# Patient Record
Sex: MALE | Race: BLACK OR AFRICAN AMERICAN | NOT HISPANIC OR LATINO | Employment: OTHER | URBAN - METROPOLITAN AREA
[De-identification: names, ages, dates, MRNs, and addresses within clinical notes are randomized per-mention and may not be internally consistent; named-entity substitution may affect disease eponyms.]

---

## 2018-12-16 ENCOUNTER — LAB REQUISITION (OUTPATIENT)
Dept: LAB | Facility: HOSPITAL | Age: 68
End: 2018-12-16

## 2018-12-16 DIAGNOSIS — Z00.00 ROUTINE GENERAL MEDICAL EXAMINATION AT A HEALTH CARE FACILITY: ICD-10-CM

## 2018-12-16 LAB
ANION GAP SERPL CALCULATED.3IONS-SCNC: 11.2 MMOL/L
ANISOCYTOSIS BLD QL: ABNORMAL
BACTERIA UR QL AUTO: ABNORMAL /HPF
BILIRUB UR QL STRIP: NEGATIVE
BUN BLD-MCNC: 17 MG/DL (ref 8–23)
BUN/CREAT SERPL: 11.2 (ref 7–25)
CALCIUM SPEC-SCNC: 8.8 MG/DL (ref 8.6–10.5)
CHLORIDE SERPL-SCNC: 99 MMOL/L (ref 98–107)
CLARITY UR: ABNORMAL
CO2 SERPL-SCNC: 29.8 MMOL/L (ref 22–29)
COLOR UR: YELLOW
CREAT BLD-MCNC: 1.52 MG/DL (ref 0.76–1.27)
DEPRECATED RDW RBC AUTO: 53.9 FL (ref 37–54)
ERYTHROCYTE [DISTWIDTH] IN BLOOD BY AUTOMATED COUNT: 15.7 % (ref 11.5–14.5)
GFR SERPL CREATININE-BSD FRML MDRD: 46 ML/MIN/1.73
GFR SERPL CREATININE-BSD FRML MDRD: 56 ML/MIN/1.73
GLUCOSE BLD-MCNC: 185 MG/DL (ref 65–99)
GLUCOSE UR STRIP-MCNC: NEGATIVE MG/DL
HCT VFR BLD AUTO: 31.6 % (ref 40.4–52.2)
HGB BLD-MCNC: 9.9 G/DL (ref 13.7–17.6)
HGB UR QL STRIP.AUTO: ABNORMAL
HYALINE CASTS UR QL AUTO: ABNORMAL /LPF
KETONES UR QL STRIP: NEGATIVE
LEUKOCYTE ESTERASE UR QL STRIP.AUTO: ABNORMAL
LYMPHOCYTES # BLD MANUAL: 6.81 10*3/MM3 (ref 0.9–4.8)
LYMPHOCYTES NFR BLD MANUAL: 57 % (ref 19.6–45.3)
LYMPHOCYTES NFR BLD MANUAL: 7 % (ref 5–12)
MCH RBC QN AUTO: 29.7 PG (ref 27–32.7)
MCHC RBC AUTO-ENTMCNC: 31.3 G/DL (ref 32.6–36.4)
MCV RBC AUTO: 94.9 FL (ref 79.8–96.2)
MONOCYTES # BLD AUTO: 0.84 10*3/MM3 (ref 0.2–1.2)
NEUTROPHILS # BLD AUTO: 4.06 10*3/MM3 (ref 1.9–8.1)
NEUTROPHILS NFR BLD MANUAL: 34 % (ref 42.7–76)
NITRITE UR QL STRIP: POSITIVE
NRBC SPEC MANUAL: 1 /100 WBC (ref 0–0)
PH UR STRIP.AUTO: 7.5 [PH] (ref 5–8)
PLAT MORPH BLD: NORMAL
PLATELET # BLD AUTO: 142 10*3/MM3 (ref 140–500)
PMV BLD AUTO: 10.4 FL (ref 6–12)
POTASSIUM BLD-SCNC: 3.8 MMOL/L (ref 3.5–5.2)
PROT UR QL STRIP: ABNORMAL
RBC # BLD AUTO: 3.33 10*6/MM3 (ref 4.6–6)
RBC # UR: ABNORMAL /HPF
REF LAB TEST METHOD: ABNORMAL
SCAN SLIDE: NORMAL
SODIUM BLD-SCNC: 140 MMOL/L (ref 136–145)
SP GR UR STRIP: 1.02 (ref 1–1.03)
SQUAMOUS #/AREA URNS HPF: ABNORMAL /HPF
UROBILINOGEN UR QL STRIP: ABNORMAL
VARIANT LYMPHS NFR BLD MANUAL: 2 % (ref 0–5)
WBC MORPH BLD: NORMAL
WBC NRBC COR # BLD: 11.95 10*3/MM3 (ref 4.5–10.7)
WBC UR QL AUTO: ABNORMAL /HPF

## 2018-12-16 PROCEDURE — 81001 URINALYSIS AUTO W/SCOPE: CPT

## 2018-12-16 PROCEDURE — 85007 BL SMEAR W/DIFF WBC COUNT: CPT

## 2018-12-16 PROCEDURE — 85025 COMPLETE CBC W/AUTO DIFF WBC: CPT

## 2018-12-16 PROCEDURE — 80048 BASIC METABOLIC PNL TOTAL CA: CPT

## 2019-12-09 ENCOUNTER — LAB REQUISITION (OUTPATIENT)
Dept: LAB | Facility: HOSPITAL | Age: 69
End: 2019-12-09

## 2019-12-09 DIAGNOSIS — Z00.00 ROUTINE GENERAL MEDICAL EXAMINATION AT A HEALTH CARE FACILITY: ICD-10-CM

## 2019-12-09 LAB — NT-PROBNP SERPL-MCNC: 67.8 PG/ML (ref 5–900)

## 2019-12-09 PROCEDURE — 83880 ASSAY OF NATRIURETIC PEPTIDE: CPT

## 2020-01-12 ENCOUNTER — APPOINTMENT (OUTPATIENT)
Dept: CT IMAGING | Facility: HOSPITAL | Age: 70
End: 2020-01-12

## 2020-01-12 ENCOUNTER — HOSPITAL ENCOUNTER (INPATIENT)
Facility: HOSPITAL | Age: 70
LOS: 7 days | Discharge: NURSING, REHAB OR LONG-TERM CARE | End: 2020-01-19
Attending: EMERGENCY MEDICINE | Admitting: INTERNAL MEDICINE

## 2020-01-12 ENCOUNTER — APPOINTMENT (OUTPATIENT)
Dept: GENERAL RADIOLOGY | Facility: HOSPITAL | Age: 70
End: 2020-01-12

## 2020-01-12 DIAGNOSIS — D63.8 ANEMIA, CHRONIC DISEASE: ICD-10-CM

## 2020-01-12 DIAGNOSIS — J96.01 SEPSIS WITH ACUTE HYPOXIC RESPIRATORY FAILURE, DUE TO UNSPECIFIED ORGANISM, UNSPECIFIED WHETHER SEPTIC SHOCK PRESENT (HCC): Primary | ICD-10-CM

## 2020-01-12 DIAGNOSIS — J44.9 CHRONIC OBSTRUCTIVE PULMONARY DISEASE, UNSPECIFIED COPD TYPE (HCC): ICD-10-CM

## 2020-01-12 DIAGNOSIS — N39.0 ACUTE UTI: ICD-10-CM

## 2020-01-12 DIAGNOSIS — N17.9 ACUTE RENAL FAILURE, UNSPECIFIED ACUTE RENAL FAILURE TYPE (HCC): ICD-10-CM

## 2020-01-12 DIAGNOSIS — A41.9 SEPSIS WITH ACUTE HYPOXIC RESPIRATORY FAILURE, DUE TO UNSPECIFIED ORGANISM, UNSPECIFIED WHETHER SEPTIC SHOCK PRESENT (HCC): Primary | ICD-10-CM

## 2020-01-12 DIAGNOSIS — D69.6 THROMBOCYTOPENIA (HCC): ICD-10-CM

## 2020-01-12 DIAGNOSIS — J96.21 ACUTE AND CHRONIC RESPIRATORY FAILURE WITH HYPOXIA (HCC): ICD-10-CM

## 2020-01-12 DIAGNOSIS — N20.0 NEPHROLITHIASIS: ICD-10-CM

## 2020-01-12 DIAGNOSIS — R65.20 SEPSIS WITH ACUTE HYPOXIC RESPIRATORY FAILURE, DUE TO UNSPECIFIED ORGANISM, UNSPECIFIED WHETHER SEPTIC SHOCK PRESENT (HCC): Primary | ICD-10-CM

## 2020-01-12 LAB
ALBUMIN SERPL-MCNC: 3.6 G/DL (ref 3.5–5.2)
ALBUMIN/GLOB SERPL: 0.7 G/DL
ALP SERPL-CCNC: 78 U/L (ref 39–117)
ALT SERPL W P-5'-P-CCNC: 30 U/L (ref 1–41)
ANION GAP SERPL CALCULATED.3IONS-SCNC: 16.5 MMOL/L (ref 5–15)
ARTERIAL PATENCY WRIST A: POSITIVE
AST SERPL-CCNC: 44 U/L (ref 1–40)
ATMOSPHERIC PRESS: 755.1 MMHG
B PARAPERT DNA SPEC QL NAA+PROBE: NOT DETECTED
B PERT DNA SPEC QL NAA+PROBE: NOT DETECTED
BACTERIA UR QL AUTO: ABNORMAL /HPF
BASE EXCESS BLDA CALC-SCNC: -0.3 MMOL/L (ref 0–2)
BDY SITE: ABNORMAL
BILIRUB SERPL-MCNC: 0.5 MG/DL (ref 0.2–1.2)
BILIRUB UR QL STRIP: NEGATIVE
BUN BLD-MCNC: 38 MG/DL (ref 8–23)
BUN/CREAT SERPL: 12.8 (ref 7–25)
C PNEUM DNA NPH QL NAA+NON-PROBE: NOT DETECTED
CALCIUM SPEC-SCNC: 9.5 MG/DL (ref 8.6–10.5)
CHLORIDE SERPL-SCNC: 99 MMOL/L (ref 98–107)
CLARITY UR: ABNORMAL
CO2 SERPL-SCNC: 24.5 MMOL/L (ref 22–29)
COLOR UR: ABNORMAL
CREAT BLD-MCNC: 2.98 MG/DL (ref 0.76–1.27)
D-LACTATE SERPL-SCNC: 1.2 MMOL/L (ref 0.5–2)
DEPRECATED RDW RBC AUTO: 44.8 FL (ref 37–54)
ERYTHROCYTE [DISTWIDTH] IN BLOOD BY AUTOMATED COUNT: 13.1 % (ref 12.3–15.4)
FLUAV H1 2009 PAND RNA NPH QL NAA+PROBE: NOT DETECTED
FLUAV H1 HA GENE NPH QL NAA+PROBE: NOT DETECTED
FLUAV H3 RNA NPH QL NAA+PROBE: NOT DETECTED
FLUAV SUBTYP SPEC NAA+PROBE: NOT DETECTED
FLUBV RNA ISLT QL NAA+PROBE: NOT DETECTED
GAS FLOW AIRWAY: 7 LPM
GFR SERPL CREATININE-BSD FRML MDRD: 21 ML/MIN/1.73
GFR SERPL CREATININE-BSD FRML MDRD: 25 ML/MIN/1.73
GLOBULIN UR ELPH-MCNC: 5 GM/DL
GLUCOSE BLD-MCNC: 128 MG/DL (ref 65–99)
GLUCOSE BLDC GLUCOMTR-MCNC: 141 MG/DL (ref 70–130)
GLUCOSE UR STRIP-MCNC: NEGATIVE MG/DL
HADV DNA SPEC NAA+PROBE: NOT DETECTED
HCO3 BLDA-SCNC: 25.9 MMOL/L (ref 22–28)
HCOV 229E RNA SPEC QL NAA+PROBE: NOT DETECTED
HCOV HKU1 RNA SPEC QL NAA+PROBE: NOT DETECTED
HCOV NL63 RNA SPEC QL NAA+PROBE: NOT DETECTED
HCOV OC43 RNA SPEC QL NAA+PROBE: NOT DETECTED
HCT VFR BLD AUTO: 30.7 % (ref 37.5–51)
HGB BLD-MCNC: 10.1 G/DL (ref 13–17.7)
HGB UR QL STRIP.AUTO: ABNORMAL
HMPV RNA NPH QL NAA+NON-PROBE: NOT DETECTED
HOLD SPECIMEN: NORMAL
HOLD SPECIMEN: NORMAL
HPIV1 RNA SPEC QL NAA+PROBE: NOT DETECTED
HPIV2 RNA SPEC QL NAA+PROBE: NOT DETECTED
HPIV3 RNA NPH QL NAA+PROBE: NOT DETECTED
HPIV4 P GENE NPH QL NAA+PROBE: NOT DETECTED
HYALINE CASTS UR QL AUTO: ABNORMAL /LPF
KETONES UR QL STRIP: ABNORMAL
LEUKOCYTE ESTERASE UR QL STRIP.AUTO: ABNORMAL
LYMPHOCYTES # BLD MANUAL: 4.18 10*3/MM3 (ref 0.7–3.1)
LYMPHOCYTES NFR BLD MANUAL: 39.6 % (ref 19.6–45.3)
LYMPHOCYTES NFR BLD MANUAL: 7.9 % (ref 5–12)
M PNEUMO IGG SER IA-ACNC: NOT DETECTED
MAGNESIUM SERPL-MCNC: 2.4 MG/DL (ref 1.6–2.4)
MCH RBC QN AUTO: 30.4 PG (ref 26.6–33)
MCHC RBC AUTO-ENTMCNC: 32.9 G/DL (ref 31.5–35.7)
MCV RBC AUTO: 92.5 FL (ref 79–97)
MODALITY: ABNORMAL
MONOCYTES # BLD AUTO: 0.83 10*3/MM3 (ref 0.1–0.9)
NEUTROPHILS # BLD AUTO: 5.54 10*3/MM3 (ref 1.7–7)
NEUTROPHILS NFR BLD MANUAL: 52.5 % (ref 42.7–76)
NITRITE UR QL STRIP: POSITIVE
NT-PROBNP SERPL-MCNC: 2104 PG/ML (ref 5–900)
PCO2 BLDA: 47.8 MM HG (ref 35–45)
PH BLDA: 7.34 PH UNITS (ref 7.35–7.45)
PH UR STRIP.AUTO: 7.5 [PH] (ref 5–8)
PLAT MORPH BLD: NORMAL
PLATELET # BLD AUTO: 113 10*3/MM3 (ref 140–450)
PMV BLD AUTO: 10 FL (ref 6–12)
PO2 BLDA: 75.8 MM HG (ref 80–100)
POLYCHROMASIA BLD QL SMEAR: ABNORMAL
POTASSIUM BLD-SCNC: 3.9 MMOL/L (ref 3.5–5.2)
PROT SERPL-MCNC: 8.6 G/DL (ref 6–8.5)
PROT UR QL STRIP: ABNORMAL
RBC # BLD AUTO: 3.32 10*6/MM3 (ref 4.14–5.8)
RBC # UR: ABNORMAL /HPF
REF LAB TEST METHOD: ABNORMAL
RHINOVIRUS RNA SPEC NAA+PROBE: NOT DETECTED
RSV RNA NPH QL NAA+NON-PROBE: NOT DETECTED
SAO2 % BLDCOA: 94 % (ref 92–99)
SODIUM BLD-SCNC: 140 MMOL/L (ref 136–145)
SP GR UR STRIP: 1.01 (ref 1–1.03)
SQUAMOUS #/AREA URNS HPF: ABNORMAL /HPF
TOTAL RATE: 28 BREATHS/MINUTE
TROPONIN T SERPL-MCNC: 0.03 NG/ML (ref 0–0.03)
TSH SERPL DL<=0.05 MIU/L-ACNC: 0.41 UIU/ML (ref 0.27–4.2)
UROBILINOGEN UR QL STRIP: ABNORMAL
WBC MORPH BLD: NORMAL
WBC NRBC COR # BLD: 10.55 10*3/MM3 (ref 3.4–10.8)
WBC UR QL AUTO: ABNORMAL /HPF
WHOLE BLOOD HOLD SPECIMEN: NORMAL
WHOLE BLOOD HOLD SPECIMEN: NORMAL

## 2020-01-12 PROCEDURE — 85007 BL SMEAR W/DIFF WBC COUNT: CPT | Performed by: EMERGENCY MEDICINE

## 2020-01-12 PROCEDURE — 25010000002 VANCOMYCIN 10 G RECONSTITUTED SOLUTION: Performed by: EMERGENCY MEDICINE

## 2020-01-12 PROCEDURE — 85025 COMPLETE CBC W/AUTO DIFF WBC: CPT | Performed by: EMERGENCY MEDICINE

## 2020-01-12 PROCEDURE — 71250 CT THORAX DX C-: CPT

## 2020-01-12 PROCEDURE — 0100U HC BIOFIRE FILMARRAY RESP PANEL 2: CPT | Performed by: EMERGENCY MEDICINE

## 2020-01-12 PROCEDURE — 87040 BLOOD CULTURE FOR BACTERIA: CPT | Performed by: EMERGENCY MEDICINE

## 2020-01-12 PROCEDURE — 82962 GLUCOSE BLOOD TEST: CPT

## 2020-01-12 PROCEDURE — 71045 X-RAY EXAM CHEST 1 VIEW: CPT

## 2020-01-12 PROCEDURE — 84484 ASSAY OF TROPONIN QUANT: CPT | Performed by: EMERGENCY MEDICINE

## 2020-01-12 PROCEDURE — 87186 SC STD MICRODIL/AGAR DIL: CPT | Performed by: EMERGENCY MEDICINE

## 2020-01-12 PROCEDURE — 36415 COLL VENOUS BLD VENIPUNCTURE: CPT

## 2020-01-12 PROCEDURE — 81001 URINALYSIS AUTO W/SCOPE: CPT | Performed by: EMERGENCY MEDICINE

## 2020-01-12 PROCEDURE — 80053 COMPREHEN METABOLIC PANEL: CPT | Performed by: EMERGENCY MEDICINE

## 2020-01-12 PROCEDURE — 83735 ASSAY OF MAGNESIUM: CPT | Performed by: EMERGENCY MEDICINE

## 2020-01-12 PROCEDURE — 94640 AIRWAY INHALATION TREATMENT: CPT

## 2020-01-12 PROCEDURE — 87181 SC STD AGAR DILUTION PER AGT: CPT | Performed by: EMERGENCY MEDICINE

## 2020-01-12 PROCEDURE — 82803 BLOOD GASES ANY COMBINATION: CPT

## 2020-01-12 PROCEDURE — 84443 ASSAY THYROID STIM HORMONE: CPT | Performed by: EMERGENCY MEDICINE

## 2020-01-12 PROCEDURE — 74176 CT ABD & PELVIS W/O CONTRAST: CPT

## 2020-01-12 PROCEDURE — P9612 CATHETERIZE FOR URINE SPEC: HCPCS

## 2020-01-12 PROCEDURE — 93005 ELECTROCARDIOGRAM TRACING: CPT | Performed by: EMERGENCY MEDICINE

## 2020-01-12 PROCEDURE — 87077 CULTURE AEROBIC IDENTIFY: CPT | Performed by: EMERGENCY MEDICINE

## 2020-01-12 PROCEDURE — 87086 URINE CULTURE/COLONY COUNT: CPT | Performed by: EMERGENCY MEDICINE

## 2020-01-12 PROCEDURE — 83605 ASSAY OF LACTIC ACID: CPT | Performed by: EMERGENCY MEDICINE

## 2020-01-12 PROCEDURE — 93010 ELECTROCARDIOGRAM REPORT: CPT | Performed by: INTERNAL MEDICINE

## 2020-01-12 PROCEDURE — 25010000002 PIPERACILLIN SOD-TAZOBACTAM PER 1 G: Performed by: EMERGENCY MEDICINE

## 2020-01-12 PROCEDURE — 83880 ASSAY OF NATRIURETIC PEPTIDE: CPT | Performed by: EMERGENCY MEDICINE

## 2020-01-12 PROCEDURE — 36600 WITHDRAWAL OF ARTERIAL BLOOD: CPT

## 2020-01-12 PROCEDURE — 99285 EMERGENCY DEPT VISIT HI MDM: CPT

## 2020-01-12 RX ORDER — POLYETHYLENE GLYCOL 3350 17 G/17G
17 POWDER, FOR SOLUTION ORAL DAILY
COMMUNITY
Start: 2017-11-06 | End: 2020-01-19 | Stop reason: HOSPADM

## 2020-01-12 RX ORDER — TRAZODONE HYDROCHLORIDE 150 MG/1
100 TABLET ORAL DAILY
COMMUNITY
Start: 2016-10-03

## 2020-01-12 RX ORDER — SODIUM CHLORIDE 9 MG/ML
125 INJECTION, SOLUTION INTRAVENOUS CONTINUOUS
Status: DISCONTINUED | OUTPATIENT
Start: 2020-01-12 | End: 2020-01-13

## 2020-01-12 RX ORDER — SODIUM CHLORIDE 0.9 % (FLUSH) 0.9 %
10 SYRINGE (ML) INJECTION AS NEEDED
Status: DISCONTINUED | OUTPATIENT
Start: 2020-01-12 | End: 2020-01-19 | Stop reason: HOSPADM

## 2020-01-12 RX ORDER — ONDANSETRON 4 MG/1
4 TABLET, FILM COATED ORAL EVERY 8 HOURS PRN
COMMUNITY
End: 2020-01-19 | Stop reason: HOSPADM

## 2020-01-12 RX ORDER — IPRATROPIUM BROMIDE AND ALBUTEROL SULFATE 2.5; .5 MG/3ML; MG/3ML
3 SOLUTION RESPIRATORY (INHALATION)
COMMUNITY
Start: 2013-03-18 | End: 2020-01-19 | Stop reason: HOSPADM

## 2020-01-12 RX ORDER — DULOXETIN HYDROCHLORIDE 60 MG/1
60 CAPSULE, DELAYED RELEASE ORAL 2 TIMES DAILY
COMMUNITY

## 2020-01-12 RX ORDER — GABAPENTIN 600 MG/1
600 TABLET ORAL 2 TIMES DAILY
COMMUNITY
Start: 2019-06-25

## 2020-01-12 RX ORDER — BISACODYL 10 MG
10 SUPPOSITORY, RECTAL RECTAL
COMMUNITY
End: 2020-01-19 | Stop reason: HOSPADM

## 2020-01-12 RX ORDER — ECHINACEA PURPUREA EXTRACT 125 MG
TABLET ORAL
COMMUNITY
End: 2020-01-19 | Stop reason: HOSPADM

## 2020-01-12 RX ORDER — LATANOPROST 50 UG/ML
SOLUTION/ DROPS OPHTHALMIC
COMMUNITY
Start: 2016-09-18

## 2020-01-12 RX ORDER — SENNOSIDES 8.6 MG
1 TABLET ORAL NIGHTLY
COMMUNITY
End: 2020-01-19 | Stop reason: HOSPADM

## 2020-01-12 RX ORDER — TAMSULOSIN HYDROCHLORIDE 0.4 MG/1
0.4 CAPSULE ORAL DAILY
COMMUNITY
Start: 2016-10-13

## 2020-01-12 RX ORDER — CHOLECALCIFEROL (VITAMIN D3) 125 MCG
3 CAPSULE ORAL DAILY
COMMUNITY

## 2020-01-12 RX ORDER — ATORVASTATIN CALCIUM 80 MG/1
80 TABLET, FILM COATED ORAL DAILY
COMMUNITY
Start: 2016-09-15

## 2020-01-12 RX ORDER — ALBUTEROL SULFATE 2.5 MG/3ML
2.5 SOLUTION RESPIRATORY (INHALATION) ONCE
Status: COMPLETED | OUTPATIENT
Start: 2020-01-12 | End: 2020-01-12

## 2020-01-12 RX ORDER — FERROUS SULFATE 325(65) MG
325 TABLET ORAL DAILY
COMMUNITY

## 2020-01-12 RX ORDER — ASCORBIC ACID 500 MG
500 TABLET ORAL DAILY
COMMUNITY
End: 2020-01-19 | Stop reason: HOSPADM

## 2020-01-12 RX ORDER — QUETIAPINE 200 MG/1
100 TABLET, FILM COATED, EXTENDED RELEASE ORAL DAILY
COMMUNITY
Start: 2016-10-18 | End: 2020-01-19 | Stop reason: HOSPADM

## 2020-01-12 RX ORDER — HYDROCODONE BITARTRATE AND ACETAMINOPHEN 10; 325 MG/1; MG/1
1 TABLET ORAL EVERY 6 HOURS PRN
COMMUNITY
Start: 2019-06-25

## 2020-01-12 RX ORDER — SIMETHICONE 125 MG
125 TABLET,CHEWABLE ORAL EVERY 6 HOURS PRN
COMMUNITY
End: 2020-01-19 | Stop reason: HOSPADM

## 2020-01-12 RX ORDER — TIZANIDINE 4 MG/1
4 TABLET ORAL 2 TIMES DAILY
COMMUNITY
End: 2020-01-19 | Stop reason: HOSPADM

## 2020-01-12 RX ORDER — BUDESONIDE AND FORMOTEROL FUMARATE DIHYDRATE 160; 4.5 UG/1; UG/1
2 AEROSOL RESPIRATORY (INHALATION) 2 TIMES DAILY
COMMUNITY

## 2020-01-12 RX ORDER — NITROGLYCERIN 0.4 MG/1
0.4 TABLET SUBLINGUAL
COMMUNITY

## 2020-01-12 RX ORDER — LEVETIRACETAM 750 MG/1
750 TABLET ORAL 2 TIMES DAILY
COMMUNITY
Start: 2017-11-05

## 2020-01-12 RX ADMIN — ALBUTEROL SULFATE 2.5 MG: 2.5 SOLUTION RESPIRATORY (INHALATION) at 22:12

## 2020-01-12 RX ADMIN — TAZOBACTAM SODIUM AND PIPERACILLIN SODIUM 3.38 G: 375; 3 INJECTION, SOLUTION INTRAVENOUS at 20:27

## 2020-01-12 RX ADMIN — SODIUM CHLORIDE 1750 MG: 9 INJECTION, SOLUTION INTRAVENOUS at 21:22

## 2020-01-12 RX ADMIN — SODIUM CHLORIDE 1000 ML: 9 INJECTION, SOLUTION INTRAVENOUS at 21:47

## 2020-01-13 LAB
CREAT BLD-MCNC: 2.42 MG/DL (ref 0.76–1.27)
D-LACTATE SERPL-SCNC: 1.2 MMOL/L (ref 0.5–2)
GFR SERPL CREATININE-BSD FRML MDRD: 32 ML/MIN/1.73
GLUCOSE BLDC GLUCOMTR-MCNC: 119 MG/DL (ref 70–130)
GLUCOSE BLDC GLUCOMTR-MCNC: 137 MG/DL (ref 70–130)
GLUCOSE BLDC GLUCOMTR-MCNC: 148 MG/DL (ref 70–130)
GLUCOSE BLDC GLUCOMTR-MCNC: 181 MG/DL (ref 70–130)
MRSA DNA SPEC QL NAA+PROBE: NORMAL
VANCOMYCIN SERPL-MCNC: 21 MCG/ML (ref 5–40)

## 2020-01-13 PROCEDURE — 25010000002 LEVETRIRACETAM PER 10 MG: Performed by: INTERNAL MEDICINE

## 2020-01-13 PROCEDURE — 25010000002 ENOXAPARIN PER 10 MG: Performed by: INTERNAL MEDICINE

## 2020-01-13 PROCEDURE — 94799 UNLISTED PULMONARY SVC/PX: CPT

## 2020-01-13 PROCEDURE — 94640 AIRWAY INHALATION TREATMENT: CPT

## 2020-01-13 PROCEDURE — 80202 ASSAY OF VANCOMYCIN: CPT | Performed by: INTERNAL MEDICINE

## 2020-01-13 PROCEDURE — 83605 ASSAY OF LACTIC ACID: CPT | Performed by: INTERNAL MEDICINE

## 2020-01-13 PROCEDURE — 82962 GLUCOSE BLOOD TEST: CPT

## 2020-01-13 PROCEDURE — 25010000002 PIPERACILLIN SOD-TAZOBACTAM PER 1 G: Performed by: INTERNAL MEDICINE

## 2020-01-13 PROCEDURE — 25010000002 VANCOMYCIN 10 G RECONSTITUTED SOLUTION: Performed by: INTERNAL MEDICINE

## 2020-01-13 PROCEDURE — 87641 MR-STAPH DNA AMP PROBE: CPT | Performed by: INTERNAL MEDICINE

## 2020-01-13 PROCEDURE — 82565 ASSAY OF CREATININE: CPT | Performed by: INTERNAL MEDICINE

## 2020-01-13 RX ORDER — SODIUM CHLORIDE 0.9 % (FLUSH) 0.9 %
10 SYRINGE (ML) INJECTION EVERY 12 HOURS SCHEDULED
Status: DISCONTINUED | OUTPATIENT
Start: 2020-01-13 | End: 2020-01-13

## 2020-01-13 RX ORDER — SODIUM CHLORIDE 0.9 % (FLUSH) 0.9 %
10 SYRINGE (ML) INJECTION AS NEEDED
Status: DISCONTINUED | OUTPATIENT
Start: 2020-01-13 | End: 2020-01-19 | Stop reason: HOSPADM

## 2020-01-13 RX ORDER — BUDESONIDE 0.5 MG/2ML
0.5 INHALANT ORAL
Status: DISCONTINUED | OUTPATIENT
Start: 2020-01-13 | End: 2020-01-19 | Stop reason: HOSPADM

## 2020-01-13 RX ORDER — DEXTROSE MONOHYDRATE 25 G/50ML
25 INJECTION, SOLUTION INTRAVENOUS
Status: DISCONTINUED | OUTPATIENT
Start: 2020-01-13 | End: 2020-01-19 | Stop reason: HOSPADM

## 2020-01-13 RX ORDER — SODIUM CHLORIDE, SODIUM LACTATE, POTASSIUM CHLORIDE, CALCIUM CHLORIDE 600; 310; 30; 20 MG/100ML; MG/100ML; MG/100ML; MG/100ML
100 INJECTION, SOLUTION INTRAVENOUS CONTINUOUS
Status: DISCONTINUED | OUTPATIENT
Start: 2020-01-13 | End: 2020-01-15

## 2020-01-13 RX ORDER — LATANOPROST 50 UG/ML
1 SOLUTION/ DROPS OPHTHALMIC DAILY
Status: DISCONTINUED | OUTPATIENT
Start: 2020-01-13 | End: 2020-01-19 | Stop reason: HOSPADM

## 2020-01-13 RX ORDER — NICOTINE POLACRILEX 4 MG
15 LOZENGE BUCCAL
Status: DISCONTINUED | OUTPATIENT
Start: 2020-01-13 | End: 2020-01-19 | Stop reason: HOSPADM

## 2020-01-13 RX ORDER — ARFORMOTEROL TARTRATE 15 UG/2ML
15 SOLUTION RESPIRATORY (INHALATION)
Status: DISCONTINUED | OUTPATIENT
Start: 2020-01-13 | End: 2020-01-18

## 2020-01-13 RX ORDER — ONDANSETRON 2 MG/ML
4 INJECTION INTRAMUSCULAR; INTRAVENOUS EVERY 6 HOURS PRN
Status: DISCONTINUED | OUTPATIENT
Start: 2020-01-13 | End: 2020-01-19 | Stop reason: HOSPADM

## 2020-01-13 RX ORDER — IPRATROPIUM BROMIDE AND ALBUTEROL SULFATE 2.5; .5 MG/3ML; MG/3ML
3 SOLUTION RESPIRATORY (INHALATION) EVERY 4 HOURS PRN
Status: DISCONTINUED | OUTPATIENT
Start: 2020-01-13 | End: 2020-01-19 | Stop reason: HOSPADM

## 2020-01-13 RX ORDER — ONDANSETRON 4 MG/1
4 TABLET, FILM COATED ORAL EVERY 6 HOURS PRN
Status: DISCONTINUED | OUTPATIENT
Start: 2020-01-13 | End: 2020-01-19 | Stop reason: HOSPADM

## 2020-01-13 RX ORDER — HYDROCODONE BITARTRATE AND ACETAMINOPHEN 10; 325 MG/1; MG/1
1 TABLET ORAL EVERY 6 HOURS PRN
Status: DISCONTINUED | OUTPATIENT
Start: 2020-01-13 | End: 2020-01-19 | Stop reason: HOSPADM

## 2020-01-13 RX ADMIN — LEVETIRACETAM 750 MG: 100 INJECTION, SOLUTION INTRAVENOUS at 03:36

## 2020-01-13 RX ADMIN — ARFORMOTEROL TARTRATE 15 MCG: 15 SOLUTION RESPIRATORY (INHALATION) at 11:39

## 2020-01-13 RX ADMIN — TAZOBACTAM SODIUM AND PIPERACILLIN SODIUM 3.38 G: 375; 3 INJECTION, SOLUTION INTRAVENOUS at 19:29

## 2020-01-13 RX ADMIN — TAZOBACTAM SODIUM AND PIPERACILLIN SODIUM 3.38 G: 375; 3 INJECTION, SOLUTION INTRAVENOUS at 11:32

## 2020-01-13 RX ADMIN — LATANOPROST 1 DROP: 50 SOLUTION OPHTHALMIC at 11:32

## 2020-01-13 RX ADMIN — SODIUM CHLORIDE, POTASSIUM CHLORIDE, SODIUM LACTATE AND CALCIUM CHLORIDE 100 ML/HR: 600; 310; 30; 20 INJECTION, SOLUTION INTRAVENOUS at 11:14

## 2020-01-13 RX ADMIN — HYDROCODONE BITARTRATE AND ACETAMINOPHEN 1 TABLET: 10; 325 TABLET ORAL at 20:46

## 2020-01-13 RX ADMIN — ARFORMOTEROL TARTRATE 15 MCG: 15 SOLUTION RESPIRATORY (INHALATION) at 19:47

## 2020-01-13 RX ADMIN — SODIUM CHLORIDE, POTASSIUM CHLORIDE, SODIUM LACTATE AND CALCIUM CHLORIDE 100 ML/HR: 600; 310; 30; 20 INJECTION, SOLUTION INTRAVENOUS at 20:46

## 2020-01-13 RX ADMIN — HYDROCODONE BITARTRATE AND ACETAMINOPHEN 1 TABLET: 10; 325 TABLET ORAL at 15:10

## 2020-01-13 RX ADMIN — ENOXAPARIN SODIUM 30 MG: 30 INJECTION SUBCUTANEOUS at 08:01

## 2020-01-13 RX ADMIN — TAZOBACTAM SODIUM AND PIPERACILLIN SODIUM 3.38 G: 375; 3 INJECTION, SOLUTION INTRAVENOUS at 04:07

## 2020-01-13 RX ADMIN — SODIUM CHLORIDE, POTASSIUM CHLORIDE, SODIUM LACTATE AND CALCIUM CHLORIDE 100 ML/HR: 600; 310; 30; 20 INJECTION, SOLUTION INTRAVENOUS at 02:36

## 2020-01-13 RX ADMIN — SODIUM CHLORIDE 1250 MG: 9 INJECTION, SOLUTION INTRAVENOUS at 11:32

## 2020-01-13 RX ADMIN — LEVETIRACETAM 750 MG: 100 INJECTION, SOLUTION INTRAVENOUS at 08:01

## 2020-01-13 RX ADMIN — BUDESONIDE 0.5 MG: 0.5 SUSPENSION RESPIRATORY (INHALATION) at 11:39

## 2020-01-13 RX ADMIN — LEVETIRACETAM 750 MG: 100 INJECTION, SOLUTION INTRAVENOUS at 20:46

## 2020-01-13 NOTE — DISCHARGE PLACEMENT REQUEST
"Yevgeniy Vang (69 y.o. Male)     Date of Birth Social Security Number Address Home Phone MRN    [id number]  [id number] [place] [id number] [phone] [id number]    Shinto Marital Status          Unknown Single       Admission Date Admission Type Admitting Provider Attending Provider Department, Room/Bed    1/12/20 Emergency Aman Escobar MD Anaya, Ervin H., MD [facility] CORONARY [facility], [address]/1    Discharge Date Discharge Disposition Discharge Destination                       Attending Provider:  Jatinder Clark MD    Allergies:  No Known Allergies    Isolation:  None   Infection:  None   Code Status:  CPR    Ht:  182.9 cm (72\")   Wt:  79.9 kg (176 lb 2.4 oz)    Admission Cmt:  None   Principal Problem:  None                Active Insurance as of 1/12/2020     Primary Coverage     Payor Plan Insurance Group Employer/Plan Group    MEDICARE MEDICARE B ONLY      Payor Plan Address Payor Plan Phone Number Payor Plan Fax Number Effective Dates    [address] [address] [phone]  4/1/2015 - None Entered    [facility] [id number]       Subscriber Name Subscriber Birth Date Member ID       YEVGENIY VANG 1950 [id number]           Secondary Coverage     Payor Plan Insurance Group Employer/Plan Group    [place]      Payor Plan Address Payor Plan Phone Number Payor Plan Fax Number Effective Dates    [address] [phone]  1/12/2020 - None Entered    [facility] [id number]       Subscriber Name Subscriber Birth Date Member ID       YEVGENIY VANG 1950 [id number]                 Emergency Contacts      (Rel.) Home Phone Work Phone Mobile Phone    SERGIO PIKE (Daughter) [phone] -- --              "

## 2020-01-13 NOTE — PROGRESS NOTES
Discharge Planning Assessment  [facility]     Patient Name: Yevgeniy Vang  MRN: [id number]  Today's Date: 1/13/2020    Admit Date: 1/12/2020    Discharge Needs Assessment     Row Name 01/13/20 1531       Living Environment    Lives With  facility resident    Current Living Arrangements  extended care facility    Primary Care Provided by  other (see comments)    Family Caregiver if Needed  none    Quality of Family Relationships  unable to assess    Able to Return to Prior Arrangements  yes    Living Arrangement Comments  from [place] Pt does not want to return       Resource/Environmental Concerns    Resource/Environmental Concerns  none       Transition Planning    Patient/Family Anticipates Transition to  long term care facility    Patient/Family Anticipated Services at Transition  skilled nursing       Discharge Needs Assessment    Concerns to be Addressed  discharge planning    Anticipated Changes Related to Illness  none    Equipment Needed After Discharge  none        Discharge Plan     Row Name 01/13/20 1532       Plan    Plan  Undetermined    Plan Comments  CCP spoke to patient at bedside to discuss discharge planning.  Pt staess that he is from [facility] but he does not want to return  He states that he is dependent with some ADL's.          Destination      Coordination has not been started for this encounter.      Durable Medical Equipment      Coordination has not been started for this encounter.      Dialysis/Infusion      Coordination has not been started for this encounter.      Home Medical Care      Coordination has not been started for this encounter.      Therapy      Coordination has not been started for this encounter.      Community Resources      Coordination has not been started for this encounter.          Demographic Summary    No documentation.       Functional Status    No documentation.       Psychosocial    No documentation.       Abuse/Neglect    No documentation.        Legal    No documentation.       Substance Abuse    No documentation.       Patient Forms    No documentation.           Sasha Colorado RN

## 2020-01-13 NOTE — PLAN OF CARE
Problem: Patient Care Overview  Goal: Plan of Care Review  Outcome: Ongoing (interventions implemented as appropriate)    Pt admitted to CCU from ER for sepsis and resp failure. Weaned down to 2L [place] which is patient's baseline. Borderline hypotensive overnight with MAP maintaining above 65. Pt more alert this morning. Voicemail left for patient's daughter to inform her of patient's admission.

## 2020-01-13 NOTE — H&P
History and Physical    Patient Name: Yevgeniy Vang  Age/Sex: 69 y.o. male  : 1950  MRN: [id number]    Date of Admission: 2020  Date of Encounter Visit: 20  Encounter Provider: Aman Escobar MD  Place of Service: [facility]   Patient Care Team:  Miracle Julio MD as PCP - General (Internal Medicine)      Subjective:     Chief Complaint: Shortness of breath    History of Present Illness:  Yevgeniy Vang is a 69 y.o. male that was transferred from the nursing home by EMS to the emergency room because of the complaint of acute on chronic shortness of breath, patient was found to be hypoxic, he is usually on 2 L nasal cannula oxygen but was requiring 15 L nonrebreather to maintain saturation in the low 90s.  Patient was unable to provide with any history, but from the EMS record which were later confirmed from the nursing home he did have recent stent and some hematuria however I do not have any medical record as to the type of stent and the indication and those records were requested.  Work-up in the ER was positive for sepsis with shock and mild mixed respiratory/metabolic acidosis with sergio cloudy urine with large leukocyte esterase and positive nitrite, blood pressure was borderline, patient was tachycardic, and the degree of hypoxemia with underlying history of COPD, with the elevated PCO2, and with the renal failure and the shock picture on presentation along with the mild thrombocytopenia and the anemia, CT of the abdomen showed some trace pleural effusion there was a large stone in the left renal pelvis 11 mm and diameter, and cluster of stones in the right renal pelvis largest being 8 mm.  Was numerous small urinary bladder calculi.  Also there was mild hydronephrosis bilaterally as well as perinephric inflammation and edema bilaterally slightly more pronounced on the right side     the decision was made to admit to the intensive care unit for close observation until the  patient is more stabilized.    Records from the nursing home were faxed and reviewed, patient was at the [facility] where he had right extracorporeal shockwave lithotripsy on 8/28/2019, we were able to call and confirm that he had a stent placed  2 days ago, we have labs from 1/8/2020 that showed a baseline creatinine of 1.3, white count of 7.4 hemoglobin of 10.3.    Pulmonary Functions Testing Results:    No results found for: FEV1, FVC, GCT9PKA, TLC, DLCO    Review of Systems:   Review of Systems  Unable to perform ROS: Acuity of condition   Respiratory: Positive for shortness of breath.    Gastrointestinal: Positive for abdominal pain (LLQ).   Genitourinary: Positive for hematuria.      Past Medical History:  Past Medical History:   Diagnosis Date   • Anemia    • Anorexia    • Anorexia    • Arthritis    • BPH (benign prostatic hyperplasia)    • CHF (congestive heart failure) (CMS/[facility])    • Constipation    • Convulsion (CMS/[facility])    • COPD (chronic obstructive pulmonary disease) (CMS/[facility])    • Dementia (CMS/[facility])    • Depression    • Diabetes mellitus (CMS/[facility])    • GERD (gastroesophageal reflux disease)    • Glaucoma    • Guillain Barré syndrome (CMS/[facility])    • Hemiplegia affecting nondominant side (CMS/[facility])    • Hyperlipidemia    • Hypertension    • Hypokalemia    • Insomnia    • Neuropathy    • Obstructive and reflux uropathy    • On home O2     2L   • Osteoporosis    • Stroke (CMS/[facility])    • Urinary retention    • Venous thrombosis and embolism    • Vitamin B12 deficiency        History reviewed. No pertinent surgical history.    Home Medications:   Medications Prior to Admission   Medication Sig Dispense Refill Last Dose   • atorvastatin (LIPITOR) 80 MG tablet Take 80 mg by mouth Daily.      • bisacodyl (DULCOLAX) 10 MG suppository Insert 10 mg into the rectum.      • budesonide-formoterol (SYMBICORT) 160-4.5 MCG/ACT inhaler Inhale 2 puffs 2 (Two) Times a Day.      • calcium  carbonate-cholecalciferol 500-400 MG-UNIT tablet tablet Take  by mouth.      • Cholecalciferol (VITAMIN D3) 125 MCG (5000 UT) tablet tablet Take 1 tablet by mouth Daily.      • DULoxetine (CYMBALTA) 60 MG capsule Take 60 mg by mouth 2 (Two) Times a Day.      • ferrous sulfate 325 (65 FE) MG tablet Take 325 mg by mouth Daily.      • gabapentin (NEURONTIN) 600 MG tablet Take 600 mg by mouth 2 (Two) Times a Day.      • guaiFENesin (ROBITUSSIN) 100 MG/5ML syrup Take 200 mg by mouth 4 (Four) Times a Day As Needed for Cough.      • HYDROcodone-acetaminophen (NORCO)  MG per tablet Take 1 tablet by mouth Every 6 (Six) Hours As Needed.      • ipratropium-albuterol (DUO-NEB) 0.5-2.5 mg/3 ml nebulizer Inhale 3 mL.      • latanoprost (XALATAN) 0.005 % ophthalmic solution       • levETIRAcetam (KEPPRA) 750 MG tablet Take 750 mg by mouth 2 (Two) Times a Day.      • magnesium hydroxide (MILK OF MAGNESIA) 400 MG/5ML suspension Take  by mouth.      • melatonin 5 MG tablet tablet Take 3 mg by mouth Daily.      • metoprolol tartrate (LOPRESSOR) 25 MG tablet Take 25 mg by mouth Daily.      • Multiple Vitamins-Minerals (THERA-M MULTIPLE VITAMINS PO) Take  by mouth.      • nitroglycerin (NITROSTAT) 0.4 MG SL tablet Place 0.4 mg under the tongue.      • ondansetron (ZOFRAN) 4 MG tablet Take 4 mg by mouth Every 8 (Eight) Hours As Needed.      • polyethylene glycol (MIRALAX) packet Take 17 g by mouth Daily.      • QUEtiapine XR (SEROQUEL XR) 200 MG 24 hr tablet Take 100 mg by mouth Daily.      • senna (SENOKOT) 8.6 MG tablet tablet Take 1 tablet by mouth Every Night.      • simethicone (MYLICON) 125 MG chewable tablet Chew 125 mg Every 6 (Six) Hours As Needed.      • sodium chloride 0.65 % nasal spray into the nostril(s) as directed by provider.      • tamsulosin (FLOMAX) 0.4 MG capsule 24 hr capsule Take 0.4 mg by mouth Daily.      • traZODone (DESYREL) 150 MG tablet Take 100 mg by mouth Daily.      • vitamin C (ASCORBIC ACID) 500 MG  tablet Take 500 mg by mouth Daily.      • tiZANidine (ZANAFLEX) 4 MG tablet Take 4 mg by mouth 2 (Two) Times a Day.          Inpatient Medications:  Scheduled Meds:   Continuous Infusions:  sodium chloride 125 mL/hr Last Rate: 125 mL/hr (01/12/20 2317)     PRN Meds:.•  sodium chloride    Allergies:  No Known Allergies    Past Social History:  Social History     Socioeconomic History   • Marital status: Single     Spouse name: Not on file   • Number of children: Not on file   • Years of education: Not on file   • Highest education level: Not on file       Past Family History:  History reviewed. No pertinent family history.        Objective:   Temp:  [99.9 °F (37.7 °C)-100.2 °F (37.9 °C)] 100.2 °F (37.9 °C)  Heart Rate:  [104-128] 104  Resp:  [24-28] 24  BP: ()/(51-74) 80/55   SpO2:  [93 %-97 %] 96 %  on  Flow (L/min):  [2-8] 2 Device (Oxygen Therapy): nasal cannula    Intake/Output Summary (Last 24 hours) at 1/13/2020 0019  Last data filed at 1/12/2020 2057  Gross per 24 hour   Intake 50 ml   Output 150 ml   Net -100 ml     Body mass index is 25.09 kg/m².      01/12/20 1938   Weight: 83.9 kg (185 lb)     Weight change:     Physical Exam:   Physical Exam   General:   Elderly gentleman, sickly looking, but in no significant distress                   Head:    Normocephalic, atraumatic.   Eyes:          Conjunctivae and sclerae normal, no icterus, PERRLA   Throat:   No oral lesions, no thrush, oral mucosa moist.    Neck:   Supple, trachea midline.  No JVD   Lungs:     Normal chest on inspection, very coarse breath sounds, with secretion pooling in the posterior oropharynx, positive rhonchi, no significant crackles anteriorly was positive crackles posteriorly, wheezing heard initially but did improve with suctioning, no prolongation of the expiratory phase.  Breathing was  labored on presentation but no longer labored at the time of arrival to the CCU.     Heart:    Regular rhythm and rapid heart rate .  No  murmurs, gallops, or rubs noted.   Abdomen:     Soft,  tender to deep palpation with no rebound , non-distended, positive bowel sounds.    Extremities:   No clubbing, cyanosis, or edema.  Old healed heel pressure ulcers with no open wounds   Pulses:   Pulses palpable and equal bilaterally.    Skin:   No bleeding or rash.,  No decubitus ulcer   Neuro:    Limited exam, when properly stimulated he was able to move extremities more confused and lethargic.    Psychiatric:    Unable to assess.     Lab Review:   Results from last 7 days   Lab Units 01/12/20 1942   SODIUM mmol/L 140   POTASSIUM mmol/L 3.9   CHLORIDE mmol/L 99   CO2 mmol/L 24.5   BUN mg/dL 38*   CREATININE mg/dL 2.98*   GLUCOSE mg/dL 128*   CALCIUM mg/dL 9.5   AST (SGOT) U/L 44*   ALT (SGPT) U/L 30   ALBUMIN g/dL 3.60   Baseline creatinine 1.3  Results from last 7 days   Lab Units 01/12/20 1942   TROPONIN T ng/mL 0.030     Results from last 7 days   Lab Units 01/12/20 1942   WBC 10*3/mm3 10.55   HEMOGLOBIN g/dL 10.1*   HEMATOCRIT % 30.7*   PLATELETS 10*3/mm3 113*   MCV fL 92.5   MCH pg 30.4   MCHC g/dL 32.9   RDW % 13.1   RDW-SD fl 44.8   MPV fL 10.0   NEUTROS ABS 10*3/mm3 5.54         Results from last 7 days   Lab Units 01/12/20 1942   MAGNESIUM mg/dL 2.4           Invalid input(s): LDLCALC  Results from last 7 days   Lab Units 01/12/20 1942   PROBNP pg/mL 2,104.0*     Results from last 7 days   Lab Units 01/12/20 1942   TSH uIU/mL 0.413     Results from last 7 days   Lab Units 01/12/20 2004   PH, ARTERIAL pH units 7.341*   PCO2, ARTERIAL mm Hg 47.8*   PO2 ART mm Hg 75.8*   HCO3 ART mmol/L 25.9     Results from last 7 days   Lab Units 01/12/20 1942   LACTATE mmol/L 1.2                 Results from last 7 days   Lab Units 01/12/20 2035   NITRITE UA  Positive*   WBC UA /HPF Too Numerous to Count*   BACTERIA UA /HPF 4+*   SQUAM EPITHEL UA /HPF 0-2     Results from last 7 days   Lab Units 01/12/20  2670   ADENOVIRUS DETECTION BY PCR  Not Detected      CORONAVIRUS 229E  Not Detected   CORONAVIRUS HKU1  Not Detected   CORONAVIRUS NL63  Not Detected   CORONAVIRUS OC43  Not Detected   HUMAN METAPNEUMOVIRUS  Not Detected   HUMAN RHINOVIRUS/ENTEROVIRUS  Not Detected   INFLUENZA B PCR  Not Detected   PARAINFLUENZA 1  Not Detected   PARAINFLUENZA VIRUS 2  Not Detected   PARAINFLUENZA VIRUS 3  Not Detected   PARAINFLUENZA VIRUS 4  Not Detected   BORDETELLA PERTUSSIS PCR  Not Detected   CHLAMYDOPHILA PNEUMONIAE PCR  Not Detected   MYCOPLAMA PNEUMO PCR  Not Detected   INFLUENZA A PCR  Not Detected   INFLUENZA A H3  Not Detected   INFLUENZA A H1  Not Detected   RSV, PCR  Not Detected           Imaging:  Imaging Results (Most Recent)     Procedure Component Value Units Date/Time    XR Chest 1 View [[id number]] Collected:  01/12/20 2012     Updated:  01/12/20 2351    Narrative:       PORTABLE CHEST X-RAY     CLINICAL HISTORY: hypoxemia     COMPARISON:  None.     FINDINGS:  Single portable view of the chest obtained.  There are  various overlying monitor leads/artifacts in place. The lungs are well  expanded and clear where they can be visualized. There are calcified  residua of granulomatous disease present. Cardiac size is within normal  limits.  Vascularity is normal considering technique.  No pleural fluid  is demonstrated by portable imaging.                Impression:          No active disease by portable imaging.     This report was finalized on 1/12/2020 11:48 PM by Marvin Galvan M.D.       CT Abdomen Pelvis Without Contrast [[id number]] Collected:  01/12/20 2138     Updated:  01/12/20 2309    Narrative:       CT SCANS CHEST, ABDOMEN, PELVIS     HISTORY:  hypoxemia; A41.9-Sepsis, unspecified organism; R65.20-Severe  sepsis without septic shock; J96.01-Acute respiratory failure with  hypoxia; N39.0-Urinary tract infection, site not specified;  D69.6-Thrombocytopenia, unspecified     COMPARISON: None.     TECHNIQUE: Radiation dose reduction techniques were utilized,  including  automated exposure control and exposure modulation based on body size.  Axial images were obtained from the thoracic inlet through the symphysis  pubis without oral or IV contrast, per request. Lack of contrast limits  exam     FINDINGS CHEST CT:  Respiratory motion degrades quality. Some mild,  mostly dependent densities in the lower lobes, right greater than left  are favored to be related to areas of atelectasis but associated mild  pneumonia cannot be entirely excluded. There are calcified residua of  granulomatous disease present. Upper lobes are clear. There is a  miniscule left pleural effusion. No obvious adenopathy by noncontrast  technique. Aorta nonaneurysmal. Normal heart size.     FINDINGS ABDOMEN/PELVIS CT:  There is artifact due to motion and the  patient's upper extremities which were not elevated during acquisition.  Bilateral ureteral stents are in place with bilateral nephrolithiasis.  There is a large stone dependently in the left renal pelvis measuring 11  mm. Cluster of stones present in the right renal pelvis, largest  measuring approximately 8 mm. There are numerous small urinary bladder  calculi. There is some mild hydronephrosis bilaterally as well as  perinephric inflammation and edema bilaterally, slightly more pronounced  on the right side.     Tiny gallstones are present dependently in a nondistended gallbladder.  The remaining solid organs are grossly unremarkable considering lack of  contrast and the aforementioned artifact. Patient is status post stent  graft repair of an infrarenal abdominal aortic aneurysm. The native  aneurysm measures approximately 5.2 x 4.5 cm. Moderate stool with large  amount of fecal material in the rectum which is distended at 10.2 cm  diameter. The GI tract not opacified for assessment but non obstructive  in appearance. Normal appendix. No ascites.              IMPRESSION CHEST CT:    1. Mild lower lobe opacities as discussed with miniscule left  effusion.     IMPRESSION ABDOMEN & PELVIS CT:    1. Bilateral nephrolithiasis and other genitourinary related findings as  discussed.  2. Uncomplicated cholelithiasis.  3. Constipation with rectal distention but no obstruction.  4. Infrarenal abdominal aortic aneurysm which is status post stent graft  repair              This report was finalized on 1/12/2020 11:06 PM by Mavrin Galvan M.D.       CT Chest Without Contrast [[id number]] Collected:  01/12/20 2138     Updated:  01/12/20 2309    Narrative:       CT SCANS CHEST, ABDOMEN, PELVIS     HISTORY:  hypoxemia; A41.9-Sepsis, unspecified organism; R65.20-Severe  sepsis without septic shock; J96.01-Acute respiratory failure with  hypoxia; N39.0-Urinary tract infection, site not specified;  D69.6-Thrombocytopenia, unspecified     COMPARISON: None.     TECHNIQUE: Radiation dose reduction techniques were utilized, including  automated exposure control and exposure modulation based on body size.  Axial images were obtained from the thoracic inlet through the symphysis  pubis without oral or IV contrast, per request. Lack of contrast limits  exam     FINDINGS CHEST CT:  Respiratory motion degrades quality. Some mild,  mostly dependent densities in the lower lobes, right greater than left  are favored to be related to areas of atelectasis but associated mild  pneumonia cannot be entirely excluded. There are calcified residua of  granulomatous disease present. Upper lobes are clear. There is a  miniscule left pleural effusion. No obvious adenopathy by noncontrast  technique. Aorta nonaneurysmal. Normal heart size.     FINDINGS ABDOMEN/PELVIS CT:  There is artifact due to motion and the  patient's upper extremities which were not elevated during acquisition.  Bilateral ureteral stents are in place with bilateral nephrolithiasis.  There is a large stone dependently in the left renal pelvis measuring 11  mm. Cluster of stones present in the right renal pelvis,  largest  measuring approximately 8 mm. There are numerous small urinary bladder  calculi. There is some mild hydronephrosis bilaterally as well as  perinephric inflammation and edema bilaterally, slightly more pronounced  on the right side.     Tiny gallstones are present dependently in a nondistended gallbladder.  The remaining solid organs are grossly unremarkable considering lack of  contrast and the aforementioned artifact. Patient is status post stent  graft repair of an infrarenal abdominal aortic aneurysm. The native  aneurysm measures approximately 5.2 x 4.5 cm. Moderate stool with large  amount of fecal material in the rectum which is distended at 10.2 cm  diameter. The GI tract not opacified for assessment but non obstructive  in appearance. Normal appendix. No ascites.              IMPRESSION CHEST CT:    1. Mild lower lobe opacities as discussed with miniscule left effusion.     IMPRESSION ABDOMEN & PELVIS CT:    1. Bilateral nephrolithiasis and other genitourinary related findings as  discussed.  2. Uncomplicated cholelithiasis.  3. Constipation with rectal distention but no obstruction.  4. Infrarenal abdominal aortic aneurysm which is status post stent graft  repair              This report was finalized on 1/12/2020 11:06 PM by Marvin Galvan M.D.               I personally viewed and interpreted the patient's imaging studies.    Assessment:   1. Acute hypoxemic respiratory failure  2. UTI with  sepsis  3. Acute renal failure  4. Mild anemia with thrombocytopenia  5. Bilateral hydronephrosis with nephrolithiasis and suspected pyelonephritis  6. Congestive heart failure  7. COPD  8. Diabetes mellitus  9. History of convulsive disorder  10. BPH    Plan:     Patient presenting with acute hypoxemic respiratory failure in the ER was on 15 L by the time he got to the CCU he was on 5 L and over the first hour or so he was weaned down to 2 L/min.  On bedside assessment he had a lot of secretion in the  posterior nasopharynx that were suctioned and he had significant improvement in his breath sounds afterwards  He will be treated for UTI with sepsis and for aspiration pneumonia, the CAT scan showed bibasilar infiltrate, probably early pneumonia with a background of COPD.  We will continue with the pulmonary hygiene, I do not feel the need for any high-dose systemic steroids at this point but this can be started in case of any sign of wheezing or airflow limitation  Follow-up on the culture results and modify antibiotic accordingly  Patient needs to have urology consultation for the bilateral hydro-and the pyelonephritis especially with recent stent  Awaiting the records on his recent stent placement  We will go with sliding scale insulin for his diabetes and correct any hyperglycemia, will also start the hypoglycemia protocol per the ICU core measures.  Medication reviewed, patient would be n.p.o., will give essential medication IV  Patient is to have speech therapy evaluate for aspiration/dysphagia  Patient was more critical on initial presentation to the ER, seems to be stabilizing and hopefully will be transferring out of the CCU by tomorrow if he continues to improve  Urine culture from [facility] from October 2015 showed multiple drug-resistant organism with  pseudomonal aeruginosa (resistant to amikacin ciprofloxacin gentamicin imipenem Levaquin meropenem and tobramycin and only sensitive to Zosyn and cefepime and ceftazidime) and also positive for Enterococcus faecalis (ensitive to ampicillin gentamicin nitrofurantoin and vancomycin), antibiotic will be selected to cover for the previous pattern of resistance, I do not have any more recent cultures or the culture from the previous stenting done 2 days ago    Patient was seen and examined before midnight on 1/12/2020      Aman Escobar MD  [facility]   01/13/20  12:19 AM    Dictated utilizing Dragon dictation

## 2020-01-13 NOTE — ED PROVIDER NOTES
EMERGENCY DEPARTMENT ENCOUNTER    CHIEF COMPLAINT  Chief Complaint: shortness of breath  History given by: patient, EMS  History limited by: acuity of condition  Room Number: 14/14  PMD: Miracle Julio MD      HPI:  Pt is a 69 y.o. male who presents to the ED from NH with complaint of acute on chronic shortness of breath that was noticed earlier today. EMS reports the patient was 88% on RA when they arrived on scene. EMS reports the patient was placed on a 15L non rebreather which improved his oxygen saturation to 92%. The patient is supposed to wear 2L of supplemental oxygen at home.     The patient also complains of associated left lower quadrant abdominal pain and hematuria. The patient reports he had a procedure related to a renal stent two days ago. There are no other complaints at this time. Pt's hx is limited d/t acuity of condition.    Duration: noticed earlier today  Onset: gradual  Timing: constant  Location: respiratory  Quality: shortness of breath  Intensity/Severity: moderate  Progression: improved  Associated Symptoms: LLQ abdominal pain and hematuria  Aggravating Factors: none specified  Alleviating Factors: none specified  Previous Episodes: none specified  Treatment before arrival: EMS reports the patient was placed on a 15L non rebreather which improved his oxygen saturation to 92%.    PAST MEDICAL HISTORY  Active Ambulatory Problems     Diagnosis Date Noted   • No Active Ambulatory Problems     Resolved Ambulatory Problems     Diagnosis Date Noted   • No Resolved Ambulatory Problems     No Additional Past Medical History       PAST SURGICAL HISTORY  No past surgical history on file.    FAMILY HISTORY  No family history on file.    SOCIAL HISTORY  Social History     Socioeconomic History   • Marital status: Single     Spouse name: Not on file   • Number of children: Not on file   • Years of education: Not on file   • Highest education level: Not on file       ALLERGIES  Patient has no  known allergies.    REVIEW OF SYSTEMS  Review of Systems   Unable to perform ROS: Acuity of condition   Respiratory: Positive for shortness of breath.    Gastrointestinal: Positive for abdominal pain (LLQ).   Genitourinary: Positive for hematuria.       PHYSICAL EXAM  ED Triage Vitals   Temp Heart Rate Resp BP SpO2   01/12/20 1935 01/12/20 1934 01/12/20 1935 01/12/20 1935 01/12/20 1934   99.9 °F (37.7 °C) (!) 128 28 106/67 95 %      Temp src Heart Rate Source Patient Position BP Location FiO2 (%)   01/12/20 1935 -- 01/12/20 1935 01/12/20 1935 --   Tympanic  Sitting Left arm        Physical Exam   Constitutional: He is oriented to person, place, and time. No distress.   chronically ill appearing, elderly   HENT:   Head: Normocephalic and atraumatic.   Eyes: Pupils are equal, round, and reactive to light. EOM are normal.   Neck: Normal range of motion. Neck supple.   Cardiovascular: Regular rhythm, normal heart sounds and intact distal pulses. Tachycardia present. Exam reveals no gallop and no friction rub.   No murmur heard.  Pulmonary/Chest: Effort normal. Tachypnea noted. No respiratory distress. He has no decreased breath sounds. He has no wheezes. He has rhonchi (bilaterally, audible from the door). He has no rales. He exhibits no tenderness.   Abdominal: Soft. Bowel sounds are normal. He exhibits no distension and no mass. There is tenderness in the left lower quadrant. There is no rebound and no guarding. No hernia. Hernia confirmed negative in the left inguinal area.   Musculoskeletal: Normal range of motion. He exhibits no edema.   Neurological: He is alert and oriented to person, place, and time. He has normal sensation and normal strength.   Skin: Skin is warm and dry.   Psychiatric: Mood and affect normal.   He is restless.   Nursing note and vitals reviewed.      LAB RESULTS  Lab Results (last 24 hours)     Procedure Component Value Units Date/Time    CBC & Differential [[id number]] Collected:  01/12/20  1942    Specimen:  Blood Updated:  01/12/20 2022    Narrative:       The following orders were created for panel order CBC & Differential.  Procedure                               Abnormality         Status                     ---------                               -----------         ------                     CBC Auto Differential[[id number]]        Abnormal            Final result                 Please view results for these tests on the individual orders.    Comprehensive Metabolic Panel [[id number]]  (Abnormal) Collected:  01/12/20 1942    Specimen:  Blood Updated:  01/12/20 2016     Glucose 128 mg/dL      BUN 38 mg/dL      Creatinine 2.98 mg/dL      Sodium 140 mmol/L      Potassium 3.9 mmol/L      Chloride 99 mmol/L      CO2 24.5 mmol/L      Calcium 9.5 mg/dL      Total Protein 8.6 g/dL      Albumin 3.60 g/dL      ALT (SGPT) 30 U/L      AST (SGOT) 44 U/L      Alkaline Phosphatase 78 U/L      Total Bilirubin 0.5 mg/dL      eGFR Non African Amer 21 mL/min/1.73      eGFR  African Amer 25 mL/min/1.73      Globulin 5.0 gm/dL      A/G Ratio 0.7 g/dL      BUN/Creatinine Ratio 12.8     Anion Gap 16.5 mmol/L     Narrative:       GFR Normal >60  Chronic Kidney Disease <60  Kidney Failure <15      Lactic Acid, Plasma [[id number]]  (Normal) Collected:  01/12/20 1942    Specimen:  Blood Updated:  01/12/20 2014     Lactate 1.2 mmol/L     Blood Culture - Blood, Hand, Right [[id number]] Collected:  01/12/20 1942    Specimen:  Blood from Hand, Right Updated:  01/1950    CBC Auto Differential [[id number]]  (Abnormal) Collected:  01/12/20 1942    Specimen:  Blood Updated:  01/12/20 2022     WBC 10.55 10*3/mm3      RBC 3.32 10*6/mm3      Hemoglobin 10.1 g/dL      Hematocrit 30.7 %      MCV 92.5 fL      MCH 30.4 pg      MCHC 32.9 g/dL      RDW 13.1 %      RDW-SD 44.8 fl      MPV 10.0 fL      Platelets 113 10*3/mm3     Troponin [[id number]]  (Normal) Collected:  01/12/20 1942    Specimen:  Blood Updated:  01/12/20 2016      Troponin T 0.030 ng/mL     Narrative:       Troponin T Reference Range:  <= 0.03 ng/mL-   Negative for AMI  >0.03 ng/mL-     Abnormal for myocardial necrosis.  Clinicians would have to utilize clinical acumen, EKG, Troponin and serial changes to determine if it is an Acute Myocardial Infarction or myocardial injury due to an underlying chronic condition.       Results may be falsely decreased if patient taking Biotin.      BNP [[id number]]  (Abnormal) Collected:  01/12/20 1942    Specimen:  Blood Updated:  01/12/20 2014     proBNP 2,104.0 pg/mL     Narrative:       Among patients with dyspnea, NT-proBNP is highly sensitive for the detection of acute congestive heart failure. In addition NT-proBNP of <300 pg/ml effectively rules out acute congestive heart failure with 99% negative predictive value.    Results may be falsely decreased if patient taking Biotin.      Manual Differential [[id number]]  (Abnormal) Collected:  01/12/20 1942    Specimen:  Blood Updated:  01/12/20 2022     Neutrophil % 52.5 %      Lymphocyte % 39.6 %      Monocyte % 7.9 %      Neutrophils Absolute 5.54 10*3/mm3      Lymphocytes Absolute 4.18 10*3/mm3      Monocytes Absolute 0.83 10*3/mm3      Polychromasia Slight/1+     WBC Morphology Normal     Platelet Morphology Normal    Magnesium [[id number]]  (Normal) Collected:  01/12/20 1942    Specimen:  Blood Updated:  01/12/20 2108     Magnesium 2.4 mg/dL     TSH [[id number]]  (Normal) Collected:  01/12/20 1942    Specimen:  Blood Updated:  01/12/20 2121     TSH 0.413 uIU/mL     Blood Culture - Blood, Arm, Left [[id number]] Collected:  01/12/20 1943    Specimen:  Blood from Arm, Left Updated:  01/12/20 1949    Blood Gas, Arterial [[id number]]  (Abnormal) Collected:  01/12/20 2004    Specimen:  Arterial Blood Updated:  01/12/20 2006     Site Arterial: left radial     James's Test Positive     pH, Arterial 7.341 pH units      pCO2, Arterial 47.8 mm Hg      pO2, Arterial 75.8 mm Hg      HCO3, Arterial 25.9  mmol/L      Base Excess, Arterial -0.3 mmol/L      O2 Saturation Calculated 94.0 %      Barometric Pressure for Blood Gas 755.1 mmHg      Modality Cannula     Flow Rate 7 lpm      Rate 28 Breaths/minute     Urinalysis With Culture If Indicated - Urine, Catheter [[id number]]  (Abnormal) Collected:  01/12/20 2035    Specimen:  Urine, Catheter Updated:  01/12/20 2053     Color, UA Myrtle     Appearance, UA Cloudy     pH, UA 7.5     Specific Gravity, UA 1.015     Glucose, UA Negative     Ketones, UA Trace     Bilirubin, UA Negative     Blood, UA Large (3+)     Protein,  mg/dL (2+)     Leuk Esterase, UA Large (3+)     Nitrite, UA Positive     Urobilinogen, UA 1.0 E.U./dL    Urinalysis, Microscopic Only - Urine, Catheter [[id number]]  (Abnormal) Collected:  01/12/20 2035    Specimen:  Urine, Catheter Updated:  01/12/20 2056     RBC, UA Too Numerous to Count /HPF      WBC, UA Too Numerous to Count /HPF      Bacteria, UA 4+ /HPF      Squamous Epithelial Cells, UA 0-2 /HPF      Hyaline Casts, UA None Seen /LPF      Methodology Automated Microscopy    Urine Culture - Urine, Urine, Catheter [[id number]] Collected:  01/12/20 2035    Specimen:  Urine, Catheter Updated:  01/12/20 2046    Respiratory Panel, PCR - Swab, Nasopharynx [[id number]] Collected:  01/12/20 2127    Specimen:  Swab from Nasopharynx Updated:  01/12/20 2132          I ordered the above labs and reviewed the results    RADIOLOGY  Ct Abdomen Pelvis Without Contrast    Result Date: 1/12/2020  Narrative: CT SCANS CHEST, ABDOMEN, PELVIS  HISTORY:  hypoxemia; A41.9-Sepsis, unspecified organism; R65.20-Severe sepsis without septic shock; J96.01-Acute respiratory failure with hypoxia; N39.0-Urinary tract infection, site not specified; D69.6-Thrombocytopenia, unspecified  COMPARISON: None.  TECHNIQUE: Radiation dose reduction techniques were utilized, including automated exposure control and exposure modulation based on body size. Axial images were obtained from the  thoracic inlet through the symphysis pubis without oral or IV contrast, per request. Lack of contrast limits exam  FINDINGS CHEST CT:  Respiratory motion degrades quality. Some mild, mostly dependent densities in the lower lobes, right greater than left are favored to be related to areas of atelectasis but associated mild pneumonia cannot be entirely excluded. There are calcified residua of granulomatous disease present. Upper lobes are clear. There is a miniscule left pleural effusion. No obvious adenopathy by noncontrast technique. Aorta nonaneurysmal. Normal heart size.  FINDINGS ABDOMEN/PELVIS CT:  There is artifact due to motion and the patient's upper extremities which were not elevated during acquisition. Bilateral ureteral stents are in place with bilateral nephrolithiasis. There is a large stone dependently in the left renal pelvis measuring 11 mm. Cluster of stones present in the right renal pelvis, largest measuring approximately 8 mm. There are numerous small urinary bladder calculi. There is some mild hydronephrosis bilaterally as well as perinephric inflammation and edema bilaterally, slightly more pronounced on the right side.  Tiny gallstones are present dependently in a nondistended gallbladder. The remaining solid organs are grossly unremarkable considering lack of contrast and the aforementioned artifact. Patient is status post stent graft repair of an infrarenal abdominal aortic aneurysm. The native aneurysm measures approximately 5.2 x 4.5 cm. Moderate stool with large amount of fecal material in the rectum which is distended at 10.2 cm diameter. The GI tract not opacified for assessment but non obstructive in appearance. Normal appendix. No ascites.     IMPRESSION CHEST CT:  1. Mild lower lobe opacities as discussed with miniscule left effusion.  IMPRESSION ABDOMEN & PELVIS CT:  1. Bilateral nephrolithiasis and other genitourinary related findings as discussed. 2. Uncomplicated cholelithiasis.  3. Constipation with rectal distention but no obstruction. 4. Infrarenal abdominal aortic aneurysm which is status post stent graft repair         Ct Chest Without Contrast    Result Date: 1/12/2020  Narrative: CT SCANS CHEST, ABDOMEN, PELVIS  HISTORY:  hypoxemia; A41.9-Sepsis, unspecified organism; R65.20-Severe sepsis without septic shock; J96.01-Acute respiratory failure with hypoxia; N39.0-Urinary tract infection, site not specified; D69.6-Thrombocytopenia, unspecified  COMPARISON: None.  TECHNIQUE: Radiation dose reduction techniques were utilized, including automated exposure control and exposure modulation based on body size. Axial images were obtained from the thoracic inlet through the symphysis pubis without oral or IV contrast, per request. Lack of contrast limits exam  FINDINGS CHEST CT:  Respiratory motion degrades quality. Some mild, mostly dependent densities in the lower lobes, right greater than left are favored to be related to areas of atelectasis but associated mild pneumonia cannot be entirely excluded. There are calcified residua of granulomatous disease present. Upper lobes are clear. There is a miniscule left pleural effusion. No obvious adenopathy by noncontrast technique. Aorta nonaneurysmal. Normal heart size.  FINDINGS ABDOMEN/PELVIS CT:  There is artifact due to motion and the patient's upper extremities which were not elevated during acquisition. Bilateral ureteral stents are in place with bilateral nephrolithiasis. There is a large stone dependently in the left renal pelvis measuring 11 mm. Cluster of stones present in the right renal pelvis, largest measuring approximately 8 mm. There are numerous small urinary bladder calculi. There is some mild hydronephrosis bilaterally as well as perinephric inflammation and edema bilaterally, slightly more pronounced on the right side.  Tiny gallstones are present dependently in a nondistended gallbladder. The remaining solid organs are grossly  unremarkable considering lack of contrast and the aforementioned artifact. Patient is status post stent graft repair of an infrarenal abdominal aortic aneurysm. The native aneurysm measures approximately 5.2 x 4.5 cm. Moderate stool with large amount of fecal material in the rectum which is distended at 10.2 cm diameter. The GI tract not opacified for assessment but non obstructive in appearance. Normal appendix. No ascites.     IMPRESSION CHEST CT:  1. Mild lower lobe opacities as discussed with miniscule left effusion.  IMPRESSION ABDOMEN & PELVIS CT:  1. Bilateral nephrolithiasis and other genitourinary related findings as discussed. 2. Uncomplicated cholelithiasis. 3. Constipation with rectal distention but no obstruction. 4. Infrarenal abdominal aortic aneurysm which is status post stent graft repair         Xr Chest 1 View    Result Date: 1/12/2020  Narrative: PORTABLE CHEST X-RAY  CLINICAL HISTORY: hypoxemia  COMPARISON:  None.  FINDINGS:  Single portable view of the chest obtained.  There are various overlying monitor leads/artifacts in place. The lungs are well expanded and clear where they can be visualized. There are calcified residua of granulomatous disease present. Cardiac size is within normal limits.  Vascularity is normal considering technique.  No pleural fluid is demonstrated by portable imaging.         Impression:  No active disease by portable imaging.           I ordered the above noted radiological studies. Interpreted by radiologist. Reviewed by me in PACS.       PROCEDURES  Critical Care  Performed by: Cali Marie MD  Authorized by: Cali Marie MD     Critical care provider statement:     Critical care time (minutes):  45    Critical care start time:  1/12/2020 7:43 PM    Critical care end time:  1/12/2020 9:54 PM    Critical care time was exclusive of:  Separately billable procedures and treating other patients    Critical care was necessary to treat or prevent imminent or  life-threatening deterioration of the following conditions:  Respiratory failure    Critical care was time spent personally by me on the following activities:  Development of treatment plan with patient or surrogate, discussions with consultants, evaluation of patient's response to treatment, examination of patient, obtaining history from patient or surrogate, ordering and performing treatments and interventions, ordering and review of laboratory studies, ordering and review of radiographic studies, pulse oximetry, re-evaluation of patient's condition and review of old charts    I assumed direction of critical care for this patient from another provider in my specialty: no          EKG          EKG time: 1945  Rhythm/Rate: ST, rate of 125-130  P waves and UT: Iam is within normal limits, there is a prominent P wave  QRS, axis: IVCD   ST and T waves: no STEMI, diffuse ST/T wave changes with mild depression which may be s/t repolarization, inferolateral TWI     Interpreted Contemporaneously by me, independently viewed.  No previous EKG for comparison.    PROGRESS AND CONSULTS     1942 Ordered blood work, blood cultures, and EKG for further evaluation.    1952 Ordered Zosyn and Vancomycin to treat infection.    2019 Ordered CT Chest and CT Abd/Pelvis for further evaluation.    2025 Rechecked the patient who is resting comfortably and in NAD. Patient is stable. BP- 121/70  HR- 108 Temp- 98.5 °F (36.9 °C) (Tympanic) O2 sat- 95% on 7L NC. Informed the patient of his negative acute CXR and blood work results. Discussed the plan for admission for further evaluation and management. Pt understands and agrees with the plan, all questions answered.    2058 Ordered blood work for further evaluation.    2119 Ordered Respiratory Panel for further evaluation.    2139 Per SILVIA Sanchez, the patient developed expiratory wheezes after suctioning. Ordered Albuterol inhaler to treat patient's wheezing.    2142 Placed call to Pulmonology  for admission. Ordered IV Fluids to treat patient's tachycardia.    2144 Received a call from Dr. Escobar (Pulmonology) and discussed pt's case. Dr. Escobar agreed with plan to admit the patient to the ICU for further evaluation and management.      MEDICAL DECISION MAKING  Results were reviewed/discussed with the patient and they were also made aware of online access. Pt also made aware that some labs, such as cultures, will not be resulted during ER visit and follow up with PMD is necessary.     MDM  Number of Diagnoses or Management Options  Acute and chronic respiratory failure with hypoxia (CMS/HCC):   Acute renal failure, unspecified acute renal failure type (CMS/HCC):   Acute UTI:   Anemia, chronic disease:   Chronic obstructive pulmonary disease, unspecified COPD type (CMS/HCC):   Nephrolithiasis:   Sepsis with acute hypoxic respiratory failure, due to unspecified organism, unspecified whether septic shock present (CMS/HCC):   Thrombocytopenia (CMS/[facility]):      Amount and/or Complexity of Data Reviewed  Clinical lab tests: ordered and reviewed (Creatinine: 2.98, Lactic Acid: 1.2, UA RBC: TNTC, UA WBC: TNTC, UA Bacteria: 4+, UA Nitrite: Positive, and WBC: 10.55)  Tests in the radiology section of CPT®: ordered and reviewed (Ct Abdomen Pelvis Without Contrast: There is artifact due to motion and the patient's upper extremities which were not elevated during acquisition. Bilateral ureteral stents are in place with bilateral nephrolithiasis. There is a large stone dependently in the left renal pelvis measuring 11 mm. Cluster of stones present in the right renal pelvis, largest measuring approximately 8 mm. There are numerous small urinary bladder calculi. There is some mild hydronephrosis bilaterally as well as perinephric inflammation and edema bilaterally, slightly more pronounced on the right side.  Tiny gallstones are present dependently in a nondistended gallbladder. The remaining solid organs are grossly  unremarkable considering lack of contrast and the aforementioned artifact. Patient is status post stent graft repair of an infrarenal abdominal aortic aneurysm. The native aneurysm measures approximately 5.2 x 4.5 cm. Moderate stool with large amount of fecal material in the rectum which is distended at 10.2 cm diameter. The GI tract not opacified for assessment but non obstructive in appearance. Normal appendix. No ascites. Ct Chest Without Contrast: 1. Mild lower lobe opacities as discussed with miniscule left effusion.  IMPRESSION ABDOMEN & PELVIS CT:  1. Bilateral nephrolithiasis and other genitourinary related findings as discussed. 2. Uncomplicated cholelithiasis. 3. Constipation with rectal distention but no obstruction. 4. Infrarenal abdominal aortic aneurysm which is status post stent graft repair. Xr Chest 1 View: No active disease by portable imaging.)  Tests in the medicine section of CPT®: reviewed and ordered (See procedure notes for EKG interpretation.)  Decide to obtain previous medical records or to obtain history from someone other than the patient: yes  Obtain history from someone other than the patient: yes (EMS)  Review and summarize past medical records: yes (The patient was last hospitalized at NH in 06/2019 for near syncope and AMS. BUN of 23 and Creatinine of 1.3 on blood work that came with him from the NH. Blood work was dated 01/08/2020. Hemoglobin of 10.3. Hematocrit of 32 and Platelets of 217.)  Discuss the patient with other providers: yes (Dr. Escobar (Pulmonology))  Independent visualization of images, tracings, or specimens: yes    Critical Care  Total time providing critical care: 30-74 minutes (45 minutes)    Patient Progress  Patient progress: stable         DIAGNOSIS  Final diagnoses:   Sepsis with acute hypoxic respiratory failure, due to unspecified organism, unspecified whether septic shock present (CMS/[facility])   Acute UTI   Thrombocytopenia (CMS/[facility])   Anemia, chronic disease    Acute renal failure, unspecified acute renal failure type (CMS/HCC)   Acute and chronic respiratory failure with hypoxia (CMS/HCC)   Chronic obstructive pulmonary disease, unspecified COPD type (CMS/HCC)   Nephrolithiasis       DISPOSITION  ADMISSION TO ICU    Discussed treatment plan and reason for admission with pt/family and admitting physician.  Pt/family voiced understanding of the plan for admission for further testing/treatment as needed.    Latest Documented Vital Signs:  As of 9:59 PM  BP- 104/58 HR- 120 Temp- 100.2 °F (37.9 °C) (Rectal) O2 sat- 93%    --  Documentation assistance provided by oniel Petersen for Dr. Cali Marie MD.  Information recorded by the scribe was done at my direction and has been verified and validated by me.     Carmita Petersen  01/12/20 2155       Cali Marie MD  01/12/20 2157

## 2020-01-13 NOTE — ED NOTES
Pt to ED via ambulance from [facility]. When EMS arrived 88% on RA. Per EMS pt placed on 15L non rebreather at 92%. Pt states UTI x 3 weeks. Pt tachycardic 127 and warm to touch. Pt alert.      Laura Jolly, RN  01/12/20 1934

## 2020-01-13 NOTE — PROGRESS NOTES
"Pharmacokinetic Consult - Vancomycin Dosing (Follow-Up Note)    Yevgeniy Vang is a 69 y.o. male who is on day 1 pharmacy to dose vancomycin for Pneumonia, Urinary Tract Infection.  Pharmacy dosing per Dr. Escobar's request.   Other antimicrobials: Zosyn (Day 1)  Goal vancomycin trough: 15-20 mg/L    Current vancomycin dose: intermittent    Relevant clinical data and objective history reviewed:  182.9 cm (72\")  79.9 kg (176 lb 2.4 oz)  Body mass index is 23.89 kg/m².     He has a past medical history of Anemia, Anorexia, Anorexia, Arthritis, BPH (benign prostatic hyperplasia), CHF (congestive heart failure) (CMS/HCC), Constipation, Convulsion (CMS/HCC), COPD (chronic obstructive pulmonary disease) (CMS/HCC), Dementia (CMS/HCC), Depression, Diabetes mellitus (CMS/HCC), GERD (gastroesophageal reflux disease), Glaucoma, Guillain Barré syndrome (CMS/HCC), Hemiplegia affecting nondominant side (CMS/HCC), Hyperlipidemia, Hypertension, Hypokalemia, Insomnia, Neuropathy, Obstructive and reflux uropathy, On home O2, Osteoporosis, Stroke (CMS/HCC), Urinary retention, Venous thrombosis and embolism, and Vitamin B12 deficiency.    Allergies as of 01/12/2020   • (No Known Allergies)     Vital Signs (last 24 hours)       01/12 0700  -  01/13 0659 01/13 0700  -  01/13 0817   Most Recent    Temp (°F) 99.9 -  100.2       100.2 (37.9)    Heart Rate 104 -  128      112     112    Resp 24 -  28       24    BP 80/55 -  121/70    97/64 -  108/62     108/62    SpO2 (%) 93 -  100    91 -  92     91        Estimated Creatinine Clearance: 26.4 mL/min (A) (by C-G formula based on SCr of 2.98 mg/dL (H)).  Results from last 7 days   Lab Units 01/12/20 1942   CREATININE mg/dL 2.98*     Results from last 7 days   Lab Units 01/12/20 1942   WBC 10*3/mm3 10.55     Baseline culture/source/susceptibility:   BCx x2 sets (1/12): in process  UCx (1/12): in process  RVP (1/12): negative  MRSA PCR (1/13): negative      Imaging:  CXR (1/12): \"No active " "disease by portable imaging.\"  CT (1/12): \"Mild lower lobe opacities as discussed with miniscule left effusion... Bilateral nephrolithiasis...Infrarenal abdominal aortic aneurysm which is status post stent graft repair\"    Labs:      Results from last 7 days   Lab Units 01/12/20  1942   LACTATE mmol/L 1.2      Vancomycin Dosing History  1750 mg (~22 mg/kg) IV x1 dose: 01/12 2122   Random 01/13 0752: 21 mg/L (~10.5 hours after last dose)     Assessment/Plan  1) Vancomycin 1250 mg (~15.6 mg/kg) IV x1 dose later today when vancomycin level is estimated to be <20 mg/L.  2) Random level ordered with AM labs tomorrow. Plan to re-dose when level is estimated to be <20 mg/L.  3) Will monitor serum creatinine every 24 hours while unstable & elevated.     Pharmacy will continue to follow daily while on vancomycin and adjust as needed.     Thank you for this consult,    Fosetr Ramirez, PharmD, KATHERINE, BCPS    "

## 2020-01-13 NOTE — PROGRESS NOTES
Dr. HERLINDA Clark    [facility] CORONARY CARE    1/13/2020    Patient ID:  Name:  Yevgeniy Vang  MRN:  [id number]  1950  69 y.o.  male            CC/Reason for visit: Acute respiratory failure, sepsis, pyelonephritis    HPI: The patient is somnolent.  He does arouse and he only tells me his name.  He seems sleepy but in no distress.  He does not answer any of my additional questions.    ROS: Unobtainable, patient drowsy    Vitals:  Vitals:    01/13/20 1100 01/13/20 1141 01/13/20 1145 01/13/20 1200   BP: 110/67   110/63   Pulse: 112 112 109 107   Resp:  20 22    Temp:    99.1 °F (37.3 °C)   TempSrc:    Oral   SpO2:  96% 95% 97%   Weight:       Height:               Body mass index is 23.89 kg/m².    Intake/Output Summary (Last 24 hours) at 1/13/2020 1252  Last data filed at 1/13/2020 1200  Gross per 24 hour   Intake 3040 ml   Output 150 ml   Net 2890 ml       Exam:  GEN:  Patient was asleep when I walked in the door, he required loud verbal and tactile stimulus to arouse  Alert, oriented only to himself.  Did not answer my questions, did not move his extremities on command  EYES:   Pupils equally round reactive bilaterally, conjunctivae moist and pink  ENT:    External ears/nose normal,  NECK:  No JVD, supple, midline trachea  LUNGS: Some distant and diminished breath sounds bilat, no wheezing.  Nonlabored breathing  CV:  Normal S1S2, without murmur, 1+ edema legs  ABD:  Nontender, no enlarged liver or masses  EXT:  No cyanosis or clubbing    Scheduled meds:    arformoterol 15 mcg Nebulization BID - RT   budesonide 0.5 mg Nebulization Daily - RT   enoxaparin 30 mg Subcutaneous Q24H   latanoprost 1 drop Both Eyes Daily   levETIRAcetam 750 mg Intravenous Q12H   piperacillin-tazobactam 3.375 g Intravenous Q8H   vancomycin 15 mg/kg Intravenous Once   Vancomycin Pharmacy Intermittent Dosing  Does not apply Daily     IV meds:                        lactated ringers 100 mL/hr Last Rate: 100 mL/hr  (01/13/20 1114)   Pharmacy to dose vancomycin         Data Review:   I reviewed the patient's medications and new clinical results.  Lab Results   Component Value Date    CALCIUM 9.5 01/12/2020    MG 2.4 01/12/2020     Results from last 7 days   Lab Units 01/13/20  0752 01/12/20 1942   SODIUM mmol/L  --  140   POTASSIUM mmol/L  --  3.9   CHLORIDE mmol/L  --  99   CO2 mmol/L  --  24.5   BUN mg/dL  --  38*   CREATININE mg/dL 2.42* 2.98*   CALCIUM mg/dL  --  9.5   BILIRUBIN mg/dL  --  0.5   ALK PHOS U/L  --  78   ALT (SGPT) U/L  --  30   AST (SGOT) U/L  --  44*   GLUCOSE mg/dL  --  128*   WBC 10*3/mm3  --  10.55   HEMOGLOBIN g/dL  --  10.1*   PLATELETS 10*3/mm3  --  113*   PROBNP pg/mL  --  2,104.0*     Results from last 7 days   Lab Units 01/12/20 2035   URINECX  Culture in progress         Results from last 7 days   Lab Units 01/12/20 1942   TROPONIN T ng/mL 0.030     Results from last 7 days   Lab Units 01/12/20 2004   PH, ARTERIAL pH units 7.341*   PCO2, ARTERIAL mm Hg 47.8*   PO2 ART mm Hg 75.8*   FLOW RATE lpm 7   MODALITY  Cannula   O2 SATURATION CALC % 94.0       I reviewed images of CT scan of the abdomen as well as chest x-ray.  On the CT scan of the abdomen, the lower lung fields are visible and they show some infiltrates at the bases.  In the abdomen there is evidence of nephrolithiasis and some hydronephrosis bilaterally.      ASSESSMENT:     Sepsis with acute hypoxic respiratory failure (CMS/HCC)  1. Acute renal failure  2. Mild anemia with thrombocytopenia  3. Bilateral hydronephrosis with nephrolithiasis and suspected pyelonephritis  4. Aspiration pneumonia  5. Congestive heart failure  6. COPD, not exacerbated  7. Diabetes mellitus  8. History of convulsive disorder  BPH      PLAN:  Patient is new to me.  The patient requires supplemental oxygen for treatment of pneumonia and respiratory distress.  We will continue broad-spectrum antibiotics at this time since CT shows lower lobes of both lungs  with some infiltrates as well as hydronephrosis and probable pyelonephritis.  The patient does have sepsis.  We will order speech evaluation to see if the patient can swallow safely prior to feeding him.  We will give nebulized bronchodilators for management of stable COPD.  Add insulin sliding scale for coverage of hyperglycemia in this diabetic patient.  Continue IV fluids and monitor urine output closely.  Patient's creatinine is abnormally elevated at this time.  This patient has several chronic medical conditions, and now several acute medical illnesses.  Extensive amount of data was reviewed.  Images were directly visualized by me.  This patient warrants high complexity medical decision-making.        Jatinder Clark MD  1/13/2020

## 2020-01-13 NOTE — PLAN OF CARE
Problem: Patient Care Overview  Goal: Plan of Care Review  Outcome: Ongoing (interventions implemented as appropriate)  Flowsheets (Taken 1/13/2020 4273)  Progress: improving  Plan of Care Reviewed With: patient  Outcome Summary: VSS. Confused to situation. Complains of abdominal pain, norco given. Several incontinent episodes, condom catheter placed. Transfer out of unit. CTM.

## 2020-01-13 NOTE — PROGRESS NOTES
"Pharmacokinetic Consult - Vancomycin and Zosyn Dosing (Initial Note)    Patient's name: Yevgeniy Vang   Consult for: Vancomycin and Zosyn dosing.  MRN: [id number]  : 1950  / Age: 69 y.o.  Weight: 83.9 kg (185 lb)  BMI: Body mass index is 25.09 kg/m².    Indication: PNA, Sepsis, UTI  Duration of therapy: Vanc: 8 days. Zosyn: 6 days  Consulted by: Aman Escobar  Vancomycin Goal trough: 15-20 mg/L   Other antimicrobials:      Relevant clinical data and objective history reviewed:  69 y.o. male 182.9 cm (72\") 83.9 kg (185 lb)    Past Medical History:   Diagnosis Date   • Anemia    • Anorexia    • Anorexia    • Arthritis    • BPH (benign prostatic hyperplasia)    • CHF (congestive heart failure) (CMS/[facility])    • Constipation    • Convulsion (CMS/[facility])    • COPD (chronic obstructive pulmonary disease) (CMS/[facility])    • Dementia (CMS/[facility])    • Depression    • Diabetes mellitus (CMS/[facility])    • GERD (gastroesophageal reflux disease)    • Glaucoma    • Guillain Barré syndrome (CMS/[facility])    • Hemiplegia affecting nondominant side (CMS/[facility])    • Hyperlipidemia    • Hypertension    • Hypokalemia    • Insomnia    • Neuropathy    • Obstructive and reflux uropathy    • On home O2     2L   • Osteoporosis    • Stroke (CMS/[facility])    • Urinary retention    • Venous thrombosis and embolism    • Vitamin B12 deficiency      Creatinine   Date Value Ref Range Status   2020 2.98 (H) 0.76 - 1.27 mg/dL Final   2019 1.3 0.7 - 1.5 mg/dL Final   2019 1.2 0.7 - 1.5 mg/dL Final   2019 1.1 0.7 - 1.5 mg/dL Final     BUN   Date Value Ref Range Status   2020 38 (H) 8 - 23 mg/dL Final   2019 21 (H) 7 - 20 mg/dL Final     Estimated Creatinine Clearance: 27.8 mL/min (A) (by C-G formula based on SCr of 2.98 mg/dL (H)).    Lab Results   Component Value Date    WBC 10.55 2020     Temp Readings from Last 3 Encounters:   20 100.2 °F (37.9 °C) (Rectal)        Assessment/Plan  1) Patient received Vancomycin 1750 mg " tonight at 2000. Will start vancomycin Pulse dose based on levels.     2) Vancomycin level on 1/13/20 at 0800.    3) Will monitor serum creatinine every 24 hours for the first 3 days then at least every 48 hours per dosing recommendations.      4) Pharmacy will continue to follow daily while on vancomycin and adjust as needed.     5) Will dose Zosyn 3.375 iv every 8 hours. Pharmacy will discontinue the Pharmacy to Dose consult for Zosyn at this time. Renal function will continue to be monitored and dosing adjustments will be made by pharmacy based on renal function if necessary.    Sincerely,  Jaziel Freeman, [facility]

## 2020-01-13 NOTE — CONSULTS
FIRST UROLOGY CONSULT      Patient Identification:  NAME:  Yevgeniy Vang  Age:  69 y.o.   Sex:  male   :  1950   MRN:  [id number]       Chief complaint: short of breath    History of present illness:  70yo man with multiple chronic severe problems.  He is dependent on ureteral stents due to stones.  Stents recently changed by Dr Hari Nettles.  Nothing acute showing up on the CTscan.  Septic picture      Past medical history:  Past Medical History:   Diagnosis Date   • Anemia    • Anorexia    • Anorexia    • Arthritis    • BPH (benign prostatic hyperplasia)    • CHF (congestive heart failure) (CMS/HCC)    • Constipation    • Convulsion (CMS/HCC)    • COPD (chronic obstructive pulmonary disease) (CMS/HCC)    • Dementia (CMS/[facility])    • Depression    • Diabetes mellitus (CMS/HCC)    • GERD (gastroesophageal reflux disease)    • Glaucoma    • Guillain Barré syndrome (CMS/HCC)    • Hemiplegia affecting nondominant side (CMS/HCC)    • Hyperlipidemia    • Hypertension    • Hypokalemia    • Insomnia    • Neuropathy    • Obstructive and reflux uropathy    • On home O2     2L   • Osteoporosis    • Stroke (CMS/[facility])    • Urinary retention    • Venous thrombosis and embolism    • Vitamin B12 deficiency        Past surgical history:  History reviewed. No pertinent surgical history.    Allergies:  Patient has no known allergies.    Home medications:  Medications Prior to Admission   Medication Sig Dispense Refill Last Dose   • atorvastatin (LIPITOR) 80 MG tablet Take 80 mg by mouth Daily.      • bisacodyl (DULCOLAX) 10 MG suppository Insert 10 mg into the rectum.      • budesonide-formoterol (SYMBICORT) 160-4.5 MCG/ACT inhaler Inhale 2 puffs 2 (Two) Times a Day.      • calcium carbonate-cholecalciferol 500-400 MG-UNIT tablet tablet Take  by mouth.      • Cholecalciferol (VITAMIN D3) 125 MCG (5000 UT) tablet tablet Take 1 tablet by mouth Daily.      • DULoxetine (CYMBALTA) 60 MG capsule Take 60 mg by mouth 2 (Two)  Times a Day.      • ferrous sulfate 325 (65 FE) MG tablet Take 325 mg by mouth Daily.      • gabapentin (NEURONTIN) 600 MG tablet Take 600 mg by mouth 2 (Two) Times a Day.      • guaiFENesin (ROBITUSSIN) 100 MG/5ML syrup Take 200 mg by mouth 4 (Four) Times a Day As Needed for Cough.      • HYDROcodone-acetaminophen (NORCO)  MG per tablet Take 1 tablet by mouth Every 6 (Six) Hours As Needed.      • ipratropium-albuterol (DUO-NEB) 0.5-2.5 mg/3 ml nebulizer Inhale 3 mL.      • latanoprost (XALATAN) 0.005 % ophthalmic solution       • levETIRAcetam (KEPPRA) 750 MG tablet Take 750 mg by mouth 2 (Two) Times a Day.      • magnesium hydroxide (MILK OF MAGNESIA) 400 MG/5ML suspension Take  by mouth.      • melatonin 5 MG tablet tablet Take 3 mg by mouth Daily.      • metoprolol tartrate (LOPRESSOR) 25 MG tablet Take 25 mg by mouth Daily.      • Multiple Vitamins-Minerals (THERA-M MULTIPLE VITAMINS PO) Take  by mouth.      • nitroglycerin (NITROSTAT) 0.4 MG SL tablet Place 0.4 mg under the tongue.      • ondansetron (ZOFRAN) 4 MG tablet Take 4 mg by mouth Every 8 (Eight) Hours As Needed.      • polyethylene glycol (MIRALAX) packet Take 17 g by mouth Daily.      • QUEtiapine XR (SEROQUEL XR) 200 MG 24 hr tablet Take 100 mg by mouth Daily.      • senna (SENOKOT) 8.6 MG tablet tablet Take 1 tablet by mouth Every Night.      • simethicone (MYLICON) 125 MG chewable tablet Chew 125 mg Every 6 (Six) Hours As Needed.      • sodium chloride 0.65 % nasal spray into the nostril(s) as directed by provider.      • tamsulosin (FLOMAX) 0.4 MG capsule 24 hr capsule Take 0.4 mg by mouth Daily.      • traZODone (DESYREL) 150 MG tablet Take 100 mg by mouth Daily.      • vitamin C (ASCORBIC ACID) 500 MG tablet Take 500 mg by mouth Daily.      • tiZANidine (ZANAFLEX) 4 MG tablet Take 4 mg by mouth 2 (Two) Times a Day.           Hospital medications:    arformoterol 15 mcg Nebulization BID - RT   budesonide 0.5 mg Nebulization Daily - RT      enoxaparin 30 mg Subcutaneous Q24H   latanoprost 1 drop Both Eyes Daily   levETIRAcetam 750 mg Intravenous Q12H   piperacillin-tazobactam 3.375 g Intravenous Q8H   vancomycin 15 mg/kg Intravenous Once   Vancomycin Pharmacy Intermittent Dosing  Does not apply Daily       lactated ringers 100 mL/hr Last Rate: 100 mL/hr (20 1114)   Pharmacy to dose vancomycin       ipratropium-albuterol  •  ondansetron **OR** ondansetron  •  Pharmacy to dose vancomycin  •  sodium chloride  •  sodium chloride    Family history:  History reviewed. No pertinent family history.    Social history:  Social History     Tobacco Use   • Smoking status: Not on file   Substance Use Topics   • Alcohol use: Not on file   • Drug use: Not on file       Review of systems:    Negative 12-system ROS except for the following: sob      Objective:  TMax 24 hours:   Temp (24hrs), Av.3 °F (37.4 °C), Min:98.1 °F (36.7 °C), Max:100.2 °F (37.9 °C)      Vitals Ranges:   Temp:  [98.1 °F (36.7 °C)-100.2 °F (37.9 °C)] 99.1 °F (37.3 °C)  Heart Rate:  [] 107  Resp:  [20-28] 22  BP: ()/(47-79) 110/63    Intake/Output Last 3 shifts:  I/O last 3 completed shifts:  In: 2800 [I.V.:2750; IV Piggyback:50]  Out: 150 [Urine:150]     Physical Exam:       General Appearance:    Alert, cooperative, in no acute distress   Head:    Normocephalic, without obvious abnormality, atraumatic   Eyes:          PERRL, conjunctivae and corneas clear   Ears:    Normal external inspection   Throat:   No oral lesions, oral mucosa moist   Neck:   Supple, no LAD, trachea midline   Back:     No CVA tenderness   Lungs:     Respirations unlabored, symmetric excursion    Heart:    RRR, intact peripheral pulses   Abdomen:        :       RIGO:  Extremities:     No edema, no deformity   Skin:   No bleeding, bruising or rashes   Neuro/Psych:   Orientation intact, mood/affect pleasant, no focal findings       Results review:   I reviewed the patient's new clinical  results.    Data review:  Lab Results (last 24 hours)     Procedure Component Value Units Date/Time    POC Glucose Once [[id number]]  (Abnormal) Collected:  01/13/20 1226    Specimen:  Blood Updated:  01/13/20 1238     Glucose 181 mg/dL     Urine Culture - Urine, Urine, Catheter [[id number]]  (Normal) Collected:  01/12/20 2035    Specimen:  Urine, Catheter Updated:  01/13/20 1022     Urine Culture Culture in progress    Creatinine, Serum [[id number]]  (Abnormal) Collected:  01/13/20 0752    Specimen:  Blood Updated:  01/13/20 0947     Creatinine 2.42 mg/dL      eGFR  African Amer 32 mL/min/1.73     Narrative:       GFR Normal >60  Chronic Kidney Disease <60  Kidney Failure <15      Vancomycin, Random [[id number]]  (Normal) Collected:  01/13/20 0752    Specimen:  Blood Updated:  01/13/20 0859     Vancomycin Random 21.00 mcg/mL     Lactic Acid, Plasma [[id number]]  (Normal) Collected:  01/13/20 0752    Specimen:  Blood Updated:  01/13/20 0843     Lactate 1.2 mmol/L     POC Glucose Once [[id number]]  (Abnormal) Collected:  01/13/20 0820    Specimen:  Blood Updated:  01/13/20 0842     Glucose 148 mg/dL     MRSA Screen, PCR - Swab, Nares [[id number]]  (Normal) Collected:  01/13/20 0237    Specimen:  Swab from Nares Updated:  01/13/20 0508     MRSA PCR No MRSA Detected    POC Glucose Once [[id number]]  (Abnormal) Collected:  01/12/20 2308    Specimen:  Blood Updated:  01/12/20 2309     Glucose 141 mg/dL     Respiratory Panel, PCR - Swab, Nasopharynx [[id number]]  (Normal) Collected:  01/12/20 2127    Specimen:  Swab from Nasopharynx Updated:  01/12/20 2227     ADENOVIRUS, PCR Not Detected     Coronavirus 229E Not Detected     Coronavirus HKU1 Not Detected     Coronavirus NL63 Not Detected     Coronavirus OC43 Not Detected     Human Metapneumovirus Not Detected     Human Rhinovirus/Enterovirus Not Detected     Influenza B PCR Not Detected     Parainfluenza Virus 1 Not Detected     Parainfluenza Virus 2 Not Detected      Parainfluenza Virus 3 Not Detected     Parainfluenza Virus 4 Not Detected     Bordetella pertussis pcr Not Detected     Influenza A H1 2009 PCR Not Detected     Chlamydophila pneumoniae PCR Not Detected     Mycoplasma pneumo by PCR Not Detected     Influenza A PCR Not Detected     Influenza A H3 Not Detected     Influenza A H1 Not Detected     RSV, PCR Not Detected     Bordetella parapertussis PCR Not Detected    TSH [[id number]]  (Normal) Collected:  01/12/20 1942    Specimen:  Blood Updated:  01/12/20 2121     TSH 0.413 uIU/mL     Magnesium [[id number]]  (Normal) Collected:  01/12/20 1942    Specimen:  Blood Updated:  01/12/20 2108     Magnesium 2.4 mg/dL     Urinalysis, Microscopic Only - Urine, Catheter [[id number]]  (Abnormal) Collected:  01/12/20 2035    Specimen:  Urine, Catheter Updated:  01/12/20 2056     RBC, UA Too Numerous to Count /HPF      WBC, UA Too Numerous to Count /HPF      Bacteria, UA 4+ /HPF      Squamous Epithelial Cells, UA 0-2 /HPF      Hyaline Casts, UA None Seen /LPF      Methodology Automated Microscopy    Urinalysis With Culture If Indicated - Urine, Catheter [[id number]]  (Abnormal) Collected:  01/12/20 2035    Specimen:  Urine, Catheter Updated:  01/12/20 2053     Color, UA Myrtle     Appearance, UA Cloudy     pH, UA 7.5     Specific Gravity, UA 1.015     Glucose, UA Negative     Ketones, UA Trace     Bilirubin, UA Negative     Blood, UA Large (3+)     Protein,  mg/dL (2+)     Leuk Esterase, UA Large (3+)     Nitrite, UA Positive     Urobilinogen, UA 1.0 E.U./dL    [place] Draw [[id number]] Collected:  01/12/20 1942    Specimen:  Blood Updated:  01/12/20 2045    Narrative:       The following orders were created for panel order [place] Draw.  Procedure                               Abnormality         Status                     ---------                               -----------         ------                     Light Blue Top[[id number]]                                   Final result                Green Top (Gel)[[id number]]                                  Final result               Lavender Top[[id number]]                                     Final result               Gold Top - SST[[id number]]                                   Final result                 Please view results for these tests on the individual orders.    Light Blue Top [[id number]] Collected:  01/12/20 1942    Specimen:  Blood Updated:  01/12/20 2045     Extra Tube hold for add-on     Comment: Auto resulted       Green Top (Gel) [[id number]] Collected:  01/12/20 1942    Specimen:  Blood Updated:  01/12/20 2045     Extra Tube Hold for add-ons.     Comment: Auto resulted.       Lavender Top [[id number]] Collected:  01/12/20 1942    Specimen:  Blood Updated:  01/12/20 2045     Extra Tube hold for add-on     Comment: Auto resulted       Gold Top - SST [[id number]] Collected:  01/12/20 1942    Specimen:  Blood Updated:  01/12/20 2045     Extra Tube Hold for add-ons.     Comment: Auto resulted.       CBC & Differential [[id number]] Collected:  01/12/20 1942    Specimen:  Blood Updated:  01/12/20 2022    Narrative:       The following orders were created for panel order CBC & Differential.  Procedure                               Abnormality         Status                     ---------                               -----------         ------                     CBC Auto Differential[[id number]]        Abnormal            Final result                 Please view results for these tests on the individual orders.    CBC Auto Differential [[id number]]  (Abnormal) Collected:  01/12/20 1942    Specimen:  Blood Updated:  01/12/20 2022     WBC 10.55 10*3/mm3      RBC 3.32 10*6/mm3      Hemoglobin 10.1 g/dL      Hematocrit 30.7 %      MCV 92.5 fL      MCH 30.4 pg      MCHC 32.9 g/dL      RDW 13.1 %      RDW-SD 44.8 fl      MPV 10.0 fL      Platelets 113 10*3/mm3     Manual Differential [[id number]]  (Abnormal) Collected:  01/12/20 1942    Specimen:  Blood  Updated:  01/12/20 2022     Neutrophil % 52.5 %      Lymphocyte % 39.6 %      Monocyte % 7.9 %      Neutrophils Absolute 5.54 10*3/mm3      Lymphocytes Absolute 4.18 10*3/mm3      Monocytes Absolute 0.83 10*3/mm3      Polychromasia Slight/1+     WBC Morphology Normal     Platelet Morphology Normal    Comprehensive Metabolic Panel [[id number]]  (Abnormal) Collected:  01/12/20 1942    Specimen:  Blood Updated:  01/12/20 2016     Glucose 128 mg/dL      BUN 38 mg/dL      Creatinine 2.98 mg/dL      Sodium 140 mmol/L      Potassium 3.9 mmol/L      Chloride 99 mmol/L      CO2 24.5 mmol/L      Calcium 9.5 mg/dL      Total Protein 8.6 g/dL      Albumin 3.60 g/dL      ALT (SGPT) 30 U/L      AST (SGOT) 44 U/L      Alkaline Phosphatase 78 U/L      Total Bilirubin 0.5 mg/dL      eGFR Non African Amer 21 mL/min/1.73      eGFR  African Amer 25 mL/min/1.73      Globulin 5.0 gm/dL      A/G Ratio 0.7 g/dL      BUN/Creatinine Ratio 12.8     Anion Gap 16.5 mmol/L     Narrative:       GFR Normal >60  Chronic Kidney Disease <60  Kidney Failure <15      Troponin [[id number]]  (Normal) Collected:  01/12/20 1942    Specimen:  Blood Updated:  01/12/20 2016     Troponin T 0.030 ng/mL     Narrative:       Troponin T Reference Range:  <= 0.03 ng/mL-   Negative for AMI  >0.03 ng/mL-     Abnormal for myocardial necrosis.  Clinicians would have to utilize clinical acumen, EKG, Troponin and serial changes to determine if it is an Acute Myocardial Infarction or myocardial injury due to an underlying chronic condition.       Results may be falsely decreased if patient taking Biotin.      Lactic Acid, Plasma [[id number]]  (Normal) Collected:  01/12/20 1942    Specimen:  Blood Updated:  01/12/20 2014     Lactate 1.2 mmol/L     BNP [[id number]]  (Abnormal) Collected:  01/12/20 1942    Specimen:  Blood Updated:  01/12/20 2014     proBNP 2,104.0 pg/mL     Narrative:       Among patients with dyspnea, NT-proBNP is highly sensitive for the detection of acute  congestive heart failure. In addition NT-proBNP of <300 pg/ml effectively rules out acute congestive heart failure with 99% negative predictive value.    Results may be falsely decreased if patient taking Biotin.      Blood Gas, Arterial [[id number]]  (Abnormal) Collected:  01/12/20 2004    Specimen:  Arterial Blood Updated:  01/12/20 2006     Site Arterial: left radial     James's Test Positive     pH, Arterial 7.341 pH units      pCO2, Arterial 47.8 mm Hg      pO2, Arterial 75.8 mm Hg      HCO3, Arterial 25.9 mmol/L      Base Excess, Arterial -0.3 mmol/L      O2 Saturation Calculated 94.0 %      Barometric Pressure for Blood Gas 755.1 mmHg      Modality Cannula     Flow Rate 7 lpm      Rate 28 Breaths/minute     Blood Culture - Blood, Hand, Right [[id number]] Collected:  01/12/20 1942    Specimen:  Blood from Hand, Right Updated:  01/1950    Blood Culture - Blood, Arm, Left [[id number]] Collected:  01/12/20 1943    Specimen:  Blood from Arm, Left Updated:  01/12/20 1949           Imaging:  Imaging Results (Last 24 Hours)     Procedure Component Value Units Date/Time    XR Chest 1 View [[id number]] Collected:  01/12/20 2012     Updated:  01/12/20 2351    Narrative:       PORTABLE CHEST X-RAY     CLINICAL HISTORY: hypoxemia     COMPARISON:  None.     FINDINGS:  Single portable view of the chest obtained.  There are  various overlying monitor leads/artifacts in place. The lungs are well  expanded and clear where they can be visualized. There are calcified  residua of granulomatous disease present. Cardiac size is within normal  limits.  Vascularity is normal considering technique.  No pleural fluid  is demonstrated by portable imaging.                Impression:          No active disease by portable imaging.     This report was finalized on 1/12/2020 11:48 PM by Marvin Galvan M.D.       CT Abdomen Pelvis Without Contrast [[id number]] Collected:  01/12/20 2138     Updated:  01/12/20 2309    Narrative:       CT  SCANS CHEST, ABDOMEN, PELVIS     HISTORY:  hypoxemia; A41.9-Sepsis, unspecified organism; R65.20-Severe  sepsis without septic shock; J96.01-Acute respiratory failure with  hypoxia; N39.0-Urinary tract infection, site not specified;  D69.6-Thrombocytopenia, unspecified     COMPARISON: None.     TECHNIQUE: Radiation dose reduction techniques were utilized, including  automated exposure control and exposure modulation based on body size.  Axial images were obtained from the thoracic inlet through the symphysis  pubis without oral or IV contrast, per request. Lack of contrast limits  exam     FINDINGS CHEST CT:  Respiratory motion degrades quality. Some mild,  mostly dependent densities in the lower lobes, right greater than left  are favored to be related to areas of atelectasis but associated mild  pneumonia cannot be entirely excluded. There are calcified residua of  granulomatous disease present. Upper lobes are clear. There is a  miniscule left pleural effusion. No obvious adenopathy by noncontrast  technique. Aorta nonaneurysmal. Normal heart size.     FINDINGS ABDOMEN/PELVIS CT:  There is artifact due to motion and the  patient's upper extremities which were not elevated during acquisition.  Bilateral ureteral stents are in place with bilateral nephrolithiasis.  There is a large stone dependently in the left renal pelvis measuring 11  mm. Cluster of stones present in the right renal pelvis, largest  measuring approximately 8 mm. There are numerous small urinary bladder  calculi. There is some mild hydronephrosis bilaterally as well as  perinephric inflammation and edema bilaterally, slightly more pronounced  on the right side.     Tiny gallstones are present dependently in a nondistended gallbladder.  The remaining solid organs are grossly unremarkable considering lack of  contrast and the aforementioned artifact. Patient is status post stent  graft repair of an infrarenal abdominal aortic aneurysm. The  native  aneurysm measures approximately 5.2 x 4.5 cm. Moderate stool with large  amount of fecal material in the rectum which is distended at 10.2 cm  diameter. The GI tract not opacified for assessment but non obstructive  in appearance. Normal appendix. No ascites.              IMPRESSION CHEST CT:    1. Mild lower lobe opacities as discussed with miniscule left effusion.     IMPRESSION ABDOMEN & PELVIS CT:    1. Bilateral nephrolithiasis and other genitourinary related findings as  discussed.  2. Uncomplicated cholelithiasis.  3. Constipation with rectal distention but no obstruction.  4. Infrarenal abdominal aortic aneurysm which is status post stent graft  repair              This report was finalized on 1/12/2020 11:06 PM by Marvin Galvan M.D.       CT Chest Without Contrast [[id number]] Collected:  01/12/20 2138     Updated:  01/12/20 2309    Narrative:       CT SCANS CHEST, ABDOMEN, PELVIS     HISTORY:  hypoxemia; A41.9-Sepsis, unspecified organism; R65.20-Severe  sepsis without septic shock; J96.01-Acute respiratory failure with  hypoxia; N39.0-Urinary tract infection, site not specified;  D69.6-Thrombocytopenia, unspecified     COMPARISON: None.     TECHNIQUE: Radiation dose reduction techniques were utilized, including  automated exposure control and exposure modulation based on body size.  Axial images were obtained from the thoracic inlet through the symphysis  pubis without oral or IV contrast, per request. Lack of contrast limits  exam     FINDINGS CHEST CT:  Respiratory motion degrades quality. Some mild,  mostly dependent densities in the lower lobes, right greater than left  are favored to be related to areas of atelectasis but associated mild  pneumonia cannot be entirely excluded. There are calcified residua of  granulomatous disease present. Upper lobes are clear. There is a  miniscule left pleural effusion. No obvious adenopathy by noncontrast  technique. Aorta nonaneurysmal. Normal heart  size.     FINDINGS ABDOMEN/PELVIS CT:  There is artifact due to motion and the  patient's upper extremities which were not elevated during acquisition.  Bilateral ureteral stents are in place with bilateral nephrolithiasis.  There is a large stone dependently in the left renal pelvis measuring 11  mm. Cluster of stones present in the right renal pelvis, largest  measuring approximately 8 mm. There are numerous small urinary bladder  calculi. There is some mild hydronephrosis bilaterally as well as  perinephric inflammation and edema bilaterally, slightly more pronounced  on the right side.     Tiny gallstones are present dependently in a nondistended gallbladder.  The remaining solid organs are grossly unremarkable considering lack of  contrast and the aforementioned artifact. Patient is status post stent  graft repair of an infrarenal abdominal aortic aneurysm. The native  aneurysm measures approximately 5.2 x 4.5 cm. Moderate stool with large  amount of fecal material in the rectum which is distended at 10.2 cm  diameter. The GI tract not opacified for assessment but non obstructive  in appearance. Normal appendix. No ascites.              IMPRESSION CHEST CT:    1. Mild lower lobe opacities as discussed with miniscule left effusion.     IMPRESSION ABDOMEN & PELVIS CT:    1. Bilateral nephrolithiasis and other genitourinary related findings as  discussed.  2. Uncomplicated cholelithiasis.  3. Constipation with rectal distention but no obstruction.  4. Infrarenal abdominal aortic aneurysm which is status post stent graft  repair              This report was finalized on 1/12/2020 11:06 PM by Marvin Galvan M.D.                Assessment:       Sepsis with acute hypoxic respiratory failure (CMS/HCC)    Bilateral renal stones.  Dependent on stents  Stents recently changed by DR Nettles.    Plan:     Supportive care  antibiotics    Gonzalo Mai Jr., MD  01/13/20  12:49 PM

## 2020-01-14 LAB
ANION GAP SERPL CALCULATED.3IONS-SCNC: 12.6 MMOL/L (ref 5–15)
BASOPHILS # BLD AUTO: 0.02 10*3/MM3 (ref 0–0.2)
BASOPHILS NFR BLD AUTO: 0.3 % (ref 0–1.5)
BUN BLD-MCNC: 25 MG/DL (ref 8–23)
BUN/CREAT SERPL: 15.2 (ref 7–25)
CALCIUM SPEC-SCNC: 8 MG/DL (ref 8.6–10.5)
CHLORIDE SERPL-SCNC: 104 MMOL/L (ref 98–107)
CO2 SERPL-SCNC: 22.4 MMOL/L (ref 22–29)
CREAT BLD-MCNC: 1.65 MG/DL (ref 0.76–1.27)
DEPRECATED RDW RBC AUTO: 43.2 FL (ref 37–54)
EOSINOPHIL # BLD AUTO: 0.28 10*3/MM3 (ref 0–0.4)
EOSINOPHIL NFR BLD AUTO: 4 % (ref 0.3–6.2)
ERYTHROCYTE [DISTWIDTH] IN BLOOD BY AUTOMATED COUNT: 13.1 % (ref 12.3–15.4)
GFR SERPL CREATININE-BSD FRML MDRD: 50 ML/MIN/1.73
GLUCOSE BLD-MCNC: 98 MG/DL (ref 65–99)
GLUCOSE BLDC GLUCOMTR-MCNC: 122 MG/DL (ref 70–130)
GLUCOSE BLDC GLUCOMTR-MCNC: 145 MG/DL (ref 70–130)
GLUCOSE BLDC GLUCOMTR-MCNC: 146 MG/DL (ref 70–130)
GLUCOSE BLDC GLUCOMTR-MCNC: 163 MG/DL (ref 70–130)
HCT VFR BLD AUTO: 23.9 % (ref 37.5–51)
HGB BLD-MCNC: 8 G/DL (ref 13–17.7)
IMM GRANULOCYTES # BLD AUTO: 0.04 10*3/MM3 (ref 0–0.05)
IMM GRANULOCYTES NFR BLD AUTO: 0.6 % (ref 0–0.5)
LYMPHOCYTES # BLD AUTO: 1.98 10*3/MM3 (ref 0.7–3.1)
LYMPHOCYTES NFR BLD AUTO: 28.2 % (ref 19.6–45.3)
MAGNESIUM SERPL-MCNC: 2.3 MG/DL (ref 1.6–2.4)
MCH RBC QN AUTO: 30.3 PG (ref 26.6–33)
MCHC RBC AUTO-ENTMCNC: 33.5 G/DL (ref 31.5–35.7)
MCV RBC AUTO: 90.5 FL (ref 79–97)
MONOCYTES # BLD AUTO: 0.92 10*3/MM3 (ref 0.1–0.9)
MONOCYTES NFR BLD AUTO: 13.1 % (ref 5–12)
NEUTROPHILS # BLD AUTO: 3.77 10*3/MM3 (ref 1.7–7)
NEUTROPHILS NFR BLD AUTO: 53.8 % (ref 42.7–76)
NRBC BLD AUTO-RTO: 0.1 /100 WBC (ref 0–0.2)
NT-PROBNP SERPL-MCNC: 613.5 PG/ML (ref 5–900)
PHOSPHATE SERPL-MCNC: 2.1 MG/DL (ref 2.5–4.5)
PLATELET # BLD AUTO: 126 10*3/MM3 (ref 140–450)
PMV BLD AUTO: 10 FL (ref 6–12)
POTASSIUM BLD-SCNC: 3.4 MMOL/L (ref 3.5–5.2)
PROCALCITONIN SERPL-MCNC: 21.68 NG/ML (ref 0.1–0.25)
RBC # BLD AUTO: 2.64 10*6/MM3 (ref 4.14–5.8)
SODIUM BLD-SCNC: 139 MMOL/L (ref 136–145)
VANCOMYCIN SERPL-MCNC: 18.7 MCG/ML (ref 5–40)
WBC NRBC COR # BLD: 7.01 10*3/MM3 (ref 3.4–10.8)

## 2020-01-14 PROCEDURE — 82962 GLUCOSE BLOOD TEST: CPT

## 2020-01-14 PROCEDURE — 84145 PROCALCITONIN (PCT): CPT | Performed by: INTERNAL MEDICINE

## 2020-01-14 PROCEDURE — 80202 ASSAY OF VANCOMYCIN: CPT | Performed by: INTERNAL MEDICINE

## 2020-01-14 PROCEDURE — 93005 ELECTROCARDIOGRAM TRACING: CPT | Performed by: INTERNAL MEDICINE

## 2020-01-14 PROCEDURE — 63710000001 INSULIN LISPRO (HUMAN) PER 5 UNITS: Performed by: INTERNAL MEDICINE

## 2020-01-14 PROCEDURE — 93010 ELECTROCARDIOGRAM REPORT: CPT | Performed by: INTERNAL MEDICINE

## 2020-01-14 PROCEDURE — 83735 ASSAY OF MAGNESIUM: CPT | Performed by: INTERNAL MEDICINE

## 2020-01-14 PROCEDURE — 94799 UNLISTED PULMONARY SVC/PX: CPT

## 2020-01-14 PROCEDURE — 84100 ASSAY OF PHOSPHORUS: CPT | Performed by: INTERNAL MEDICINE

## 2020-01-14 PROCEDURE — 25010000002 PIPERACILLIN SOD-TAZOBACTAM PER 1 G: Performed by: INTERNAL MEDICINE

## 2020-01-14 PROCEDURE — 25010000002 ENOXAPARIN PER 10 MG: Performed by: INTERNAL MEDICINE

## 2020-01-14 PROCEDURE — 25010000002 HYDROMORPHONE 1 MG/ML SOLUTION: Performed by: INTERNAL MEDICINE

## 2020-01-14 PROCEDURE — 80048 BASIC METABOLIC PNL TOTAL CA: CPT | Performed by: INTERNAL MEDICINE

## 2020-01-14 PROCEDURE — 85025 COMPLETE CBC W/AUTO DIFF WBC: CPT | Performed by: INTERNAL MEDICINE

## 2020-01-14 PROCEDURE — 25010000002 LEVETRIRACETAM PER 10 MG: Performed by: INTERNAL MEDICINE

## 2020-01-14 PROCEDURE — 83880 ASSAY OF NATRIURETIC PEPTIDE: CPT | Performed by: INTERNAL MEDICINE

## 2020-01-14 RX ADMIN — ARFORMOTEROL TARTRATE 15 MCG: 15 SOLUTION RESPIRATORY (INHALATION) at 20:14

## 2020-01-14 RX ADMIN — HYDROMORPHONE HYDROCHLORIDE 1 MG: 1 INJECTION, SOLUTION INTRAMUSCULAR; INTRAVENOUS; SUBCUTANEOUS at 09:13

## 2020-01-14 RX ADMIN — TAZOBACTAM SODIUM AND PIPERACILLIN SODIUM 3.38 G: 375; 3 INJECTION, SOLUTION INTRAVENOUS at 03:11

## 2020-01-14 RX ADMIN — LEVETIRACETAM 750 MG: 100 INJECTION, SOLUTION INTRAVENOUS at 20:12

## 2020-01-14 RX ADMIN — TAZOBACTAM SODIUM AND PIPERACILLIN SODIUM 3.38 G: 375; 3 INJECTION, SOLUTION INTRAVENOUS at 20:12

## 2020-01-14 RX ADMIN — SODIUM CHLORIDE, POTASSIUM CHLORIDE, SODIUM LACTATE AND CALCIUM CHLORIDE 100 ML/HR: 600; 310; 30; 20 INJECTION, SOLUTION INTRAVENOUS at 14:21

## 2020-01-14 RX ADMIN — BUDESONIDE 0.5 MG: 0.5 SUSPENSION RESPIRATORY (INHALATION) at 10:51

## 2020-01-14 RX ADMIN — ARFORMOTEROL TARTRATE 15 MCG: 15 SOLUTION RESPIRATORY (INHALATION) at 10:50

## 2020-01-14 RX ADMIN — LATANOPROST 1 DROP: 50 SOLUTION OPHTHALMIC at 08:20

## 2020-01-14 RX ADMIN — POTASSIUM PHOSPHATE, MONOBASIC AND POTASSIUM PHOSPHATE, DIBASIC 15 MMOL: 224; 236 INJECTION, SOLUTION INTRAVENOUS at 14:21

## 2020-01-14 RX ADMIN — ENOXAPARIN SODIUM 30 MG: 30 INJECTION SUBCUTANEOUS at 08:20

## 2020-01-14 RX ADMIN — HYDROMORPHONE HYDROCHLORIDE 1 MG: 1 INJECTION, SOLUTION INTRAMUSCULAR; INTRAVENOUS; SUBCUTANEOUS at 20:12

## 2020-01-14 RX ADMIN — HYDROCODONE BITARTRATE AND ACETAMINOPHEN 1 TABLET: 10; 325 TABLET ORAL at 22:27

## 2020-01-14 RX ADMIN — INSULIN LISPRO 2 UNITS: 100 INJECTION, SOLUTION INTRAVENOUS; SUBCUTANEOUS at 17:40

## 2020-01-14 RX ADMIN — LEVETIRACETAM 750 MG: 100 INJECTION, SOLUTION INTRAVENOUS at 08:20

## 2020-01-14 RX ADMIN — SODIUM CHLORIDE, POTASSIUM CHLORIDE, SODIUM LACTATE AND CALCIUM CHLORIDE 100 ML/HR: 600; 310; 30; 20 INJECTION, SOLUTION INTRAVENOUS at 22:31

## 2020-01-14 RX ADMIN — HYDROCODONE BITARTRATE AND ACETAMINOPHEN 1 TABLET: 10; 325 TABLET ORAL at 03:10

## 2020-01-14 RX ADMIN — TAZOBACTAM SODIUM AND PIPERACILLIN SODIUM 3.38 G: 375; 3 INJECTION, SOLUTION INTRAVENOUS at 12:35

## 2020-01-14 RX ADMIN — HYDROMORPHONE HYDROCHLORIDE 1 MG: 1 INJECTION, SOLUTION INTRAMUSCULAR; INTRAVENOUS; SUBCUTANEOUS at 14:11

## 2020-01-14 NOTE — PROGRESS NOTES
"Dr. HERLINDA Clark    [facility] CORONARY CARE    1/14/2020    Patient ID:  Name:  Yevgeniy Vang  MRN:  [id number]  1950  69 y.o.  male            CC/Reason for visit: Acute respiratory failure, sepsis, pyelonephritis     HPI: The patient is now much more awake.  He complains of generalized body pain, when asked to specify he does tell me it is mostly in his pelvis, and the floor of his pelvis, around \"my private parts \".     ROS:  No fever, chills or hemoptysis    Vitals:  Vitals:    01/14/20 1100 01/14/20 1129 01/14/20 1200 01/14/20 1300   BP: 106/65  106/59 123/71   Pulse: 87  89 88   Resp:       Temp:  97.8 °F (36.6 °C)     TempSrc:  Oral     SpO2: 91%  94% 92%   Weight:       Height:               Body mass index is 25.33 kg/m².    Intake/Output Summary (Last 24 hours) at 1/14/2020 1444  Last data filed at 1/14/2020 1200  Gross per 24 hour   Intake 3714 ml   Output 1025 ml   Net 2689 ml       Exam:  GEN:  No distress  Alert, oriented x 2.   LUNGS: Clear breath sounds bilat, no use of accessory muscles  CV:  Normal S1S2, without murmur, no edema  ABD:  Non tender, no enlarged liver or masses      Scheduled meds:    arformoterol 15 mcg Nebulization BID - RT   budesonide 0.5 mg Nebulization Daily - RT   [START ON 1/15/2020] enoxaparin 40 mg Subcutaneous Q24H   insulin lispro 0-7 Units Subcutaneous 4x Daily With Meals & Nightly   latanoprost 1 drop Both Eyes Daily   levETIRAcetam 750 mg Intravenous Q12H   piperacillin-tazobactam 3.375 g Intravenous Q8H     IV meds:                        lactated ringers 100 mL/hr Last Rate: 100 mL/hr (01/14/20 1421)       Data Review:   I reviewed the patient's medications and new clinical results.  Lab Results   Component Value Date    CALCIUM 8.0 (L) 01/14/2020    PHOS 2.1 (L) 01/14/2020    MG 2.3 01/14/2020    MG 2.4 01/12/2020     Results from last 7 days   Lab Units 01/14/20  0527 01/13/20  0752 01/12/20  1942   SODIUM mmol/L 139  --  140   POTASSIUM mmol/L " 3.4*  --  3.9   CHLORIDE mmol/L 104  --  99   CO2 mmol/L 22.4  --  24.5   BUN mg/dL 25*  --  38*   CREATININE mg/dL 1.65* 2.42* 2.98*   CALCIUM mg/dL 8.0*  --  9.5   BILIRUBIN mg/dL  --   --  0.5   ALK PHOS U/L  --   --  78   ALT (SGPT) U/L  --   --  30   AST (SGOT) U/L  --   --  44*   GLUCOSE mg/dL 98  --  128*   WBC 10*3/mm3 7.01  --  10.55   HEMOGLOBIN g/dL 8.0*  --  10.1*   PLATELETS 10*3/mm3 126*  --  113*   PROBNP pg/mL 613.5  --  2,104.0*   PROCALCITONIN ng/mL 21.68*  --   --      Results from last 7 days   Lab Units 01/12/20 2035 01/12/20 1943 01/12/20 1942   BLOODCX   --  No growth at 24 hours No growth at 24 hours   URINECX  >100,000 CFU/mL Pseudomonas species*  --   --          Results from last 7 days   Lab Units 01/12/20 1942   TROPONIN T ng/mL 0.030     Results from last 7 days   Lab Units 01/12/20 2004   PH, ARTERIAL pH units 7.341*   PCO2, ARTERIAL mm Hg 47.8*   PO2 ART mm Hg 75.8*   FLOW RATE lpm 7   MODALITY  Cannula   O2 SATURATION CALC % 94.0       Estimated Creatinine Clearance: 50.6 mL/min (A) (by C-G formula based on SCr of 1.65 mg/dL (H)).      ASSESSMENT:   Sepsis with acute hypoxic respiratory failure   1. Acute renal failure  2. Mild anemia with thrombocytopenia  3. Bilateral hydronephrosis with nephrolithiasis and suspected pyelonephritis  4. Aspiration pneumonia  5. Congestive heart failure  6. COPD, not exacerbated  7. Diabetes mellitus  8. History of convulsive disorder    PLAN:  Patient continues to improve.  Wean oxygen as tolerated.  Add some Dilaudid for pain control  Continue IV antibiotics  Transfer out of CCU today        Jatinder Clark MD  1/14/2020

## 2020-01-14 NOTE — PLAN OF CARE
Problem: Patient Care Overview  Goal: Plan of Care Review  Outcome: Ongoing (interventions implemented as appropriate)  Flowsheets (Taken 1/14/2020 1810)  Progress: improving  Plan of Care Reviewed With: patient  Outcome Summary: VSS. One complaint of 7/10 chest pain, EKG ordered. Dilaudid given. No further complaints. KPhos replaced. Waiting on tele bed for transfer. CTM.

## 2020-01-14 NOTE — PLAN OF CARE
VSS. Good UOP. Remains OF status. Appears to be chronically depressed. Expressed several times over the course of the night his wishes to be dead. Frequently complains about generalized pain and inadequate pain control despite Norco (10 mg) being given Q6h. Cough is weak -states that he cannot cough up sputum because it hurts. He also requests for a sleep aid at night.

## 2020-01-14 NOTE — PROGRESS NOTES
"Pharmacokinetic Evaluation: Vancomycin IV  Patient: Yevgeniy Vang  Admission Date:   MRN: [id number]  : 1950    Day of vancomycin therapy: 2  Consult for Dr. Escobar  Treating: PNA, Sepsis, UTI  Goal random: 15-20 mcg/mL    Other antimicrobials: Zosyn 3.375 gm IV q8hrs EI    Relevant clinical data and objective history reviewed:  69 y.o. male 182.9 cm (72\") 84.7 kg (186 lb 11.7 oz)  Body mass index is 25.33 kg/m².  Results from last 7 days   Lab Units 20  0527 20  0752 20   CREATININE mg/dL 1.65* 2.42* 2.98*     Estimated Creatinine Clearance: 50.6 mL/min (A) (by C-G formula based on SCr of 1.65 mg/dL (H)).    WBC   Date Value Ref Range Status   2020 7.01 3.40 - 10.80 10*3/mm3 Final   2020 10.55 3.40 - 10.80 10*3/mm3 Final     Temp:  [98.3 °F (36.8 °C)-99.1 °F (37.3 °C)] 98.3 °F (36.8 °C)  Heart Rate:  [] 86  Resp:  [20-24] 24  BP: ()/() 134/71    Intake/Output Summary (Last 24 hours) at 2020 0944  Last data filed at 2020 0446  Gross per 24 hour   Intake 3554 ml   Output 1025 ml   Net 2529 ml     Baseline labs/radiology/cultures:    Labs:  112 &  Lactate: 1.2 -> 1.2   Procalcitonin: 21.68    Radiology:    CXR: No active disease   CT Chest, abd, pelvis: Mild lower lobe opacities as discussed with miniscule left effusion. Bilateral nephrolithiasis and other genitourinary related findings as  discussed. Uncomplicated cholelithiasis. Constipation with rectal distention but no obstruction. Infrarenal abdominal aortic aneurysm which is status post stent graft  Repair.    Cultures:    Blood cx x2: NG x 24 hrs   Urine cx: >100K Pseud species   RVP: None detected   MRSA screen: Neg    Assessment/Plan:   PMH: Anemia, Anorexia, Anorexia, Arthritis, BPH, CHF, Constipation, Convulsion, COPD, Dementia, Depression, Diabetes mellitus, GERD, Glaucoma, Guillain Barré syndrome, Hemiplegia affecting nondominant side, " Hyperlipidemia, Hypertension, Hypokalemia, Insomnia, Neuropathy, Obstructive and reflux uropathy, On home O2, Osteoporosis, Stroke, Urinary retention, Venous thrombosis and embolism, and Vitamin B12 deficiency.     1. Scr improved - will monitor closely  2. Vanc random level this AM within desired range  3. Start Vancomycin 1500 mg IV q24hrs  4. Will check vanc trough on 1/15, not steady-state, to preliminary assess clearance.    Lab Results   Component Value Date    VANCO RANDOM 18.70 mcg/mL 01/14/2020 @ 05:27 am     Vancomycin IV Dosing & Levels History:  1/12 21:22 1750 mg x1   1/13 07:52 Random: 21 mcg/mL  1/13 11:32 1250 mg x1   1/14 05:27 Random: 18.70 mcg/mL    Thank you for your consult,    Oriana Barrios RP

## 2020-01-15 LAB
ANION GAP SERPL CALCULATED.3IONS-SCNC: 8.8 MMOL/L (ref 5–15)
BUN BLD-MCNC: 14 MG/DL (ref 8–23)
BUN/CREAT SERPL: 8.6 (ref 7–25)
CALCIUM SPEC-SCNC: 8.6 MG/DL (ref 8.6–10.5)
CHLORIDE SERPL-SCNC: 100 MMOL/L (ref 98–107)
CO2 SERPL-SCNC: 25.2 MMOL/L (ref 22–29)
CREAT BLD-MCNC: 1.62 MG/DL (ref 0.76–1.27)
GFR SERPL CREATININE-BSD FRML MDRD: 51 ML/MIN/1.73
GLUCOSE BLD-MCNC: 109 MG/DL (ref 65–99)
GLUCOSE BLDC GLUCOMTR-MCNC: 118 MG/DL (ref 70–130)
GLUCOSE BLDC GLUCOMTR-MCNC: 135 MG/DL (ref 70–130)
GLUCOSE BLDC GLUCOMTR-MCNC: 181 MG/DL (ref 70–130)
GLUCOSE BLDC GLUCOMTR-MCNC: 88 MG/DL (ref 70–130)
PHOSPHATE SERPL-MCNC: 3.3 MG/DL (ref 2.5–4.5)
POTASSIUM BLD-SCNC: 3.3 MMOL/L (ref 3.5–5.2)
SODIUM BLD-SCNC: 134 MMOL/L (ref 136–145)

## 2020-01-15 PROCEDURE — 25010000002 ENOXAPARIN PER 10 MG: Performed by: INTERNAL MEDICINE

## 2020-01-15 PROCEDURE — 94799 UNLISTED PULMONARY SVC/PX: CPT

## 2020-01-15 PROCEDURE — 80048 BASIC METABOLIC PNL TOTAL CA: CPT | Performed by: INTERNAL MEDICINE

## 2020-01-15 PROCEDURE — 63710000001 INSULIN LISPRO (HUMAN) PER 5 UNITS: Performed by: INTERNAL MEDICINE

## 2020-01-15 PROCEDURE — 25010000002 HYDROMORPHONE 1 MG/ML SOLUTION: Performed by: INTERNAL MEDICINE

## 2020-01-15 PROCEDURE — 82962 GLUCOSE BLOOD TEST: CPT

## 2020-01-15 PROCEDURE — 90791 PSYCH DIAGNOSTIC EVALUATION: CPT | Performed by: SOCIAL WORKER

## 2020-01-15 PROCEDURE — 25010000002 PIPERACILLIN SOD-TAZOBACTAM PER 1 G: Performed by: INTERNAL MEDICINE

## 2020-01-15 PROCEDURE — 84100 ASSAY OF PHOSPHORUS: CPT | Performed by: INTERNAL MEDICINE

## 2020-01-15 PROCEDURE — 99223 1ST HOSP IP/OBS HIGH 75: CPT | Performed by: INTERNAL MEDICINE

## 2020-01-15 PROCEDURE — 25010000002 LEVETRIRACETAM PER 10 MG: Performed by: INTERNAL MEDICINE

## 2020-01-15 PROCEDURE — 25010000002 CEFEPIME PER 500 MG: Performed by: INTERNAL MEDICINE

## 2020-01-15 RX ORDER — POTASSIUM CHLORIDE 750 MG/1
40 CAPSULE, EXTENDED RELEASE ORAL ONCE
Status: COMPLETED | OUTPATIENT
Start: 2020-01-15 | End: 2020-01-15

## 2020-01-15 RX ORDER — QUETIAPINE FUMARATE 50 MG/1
100 TABLET, EXTENDED RELEASE ORAL DAILY
Status: DISCONTINUED | OUTPATIENT
Start: 2020-01-15 | End: 2020-01-16

## 2020-01-15 RX ORDER — CHOLECALCIFEROL (VITAMIN D3) 125 MCG
5 CAPSULE ORAL NIGHTLY PRN
Status: DISCONTINUED | OUTPATIENT
Start: 2020-01-15 | End: 2020-01-19 | Stop reason: HOSPADM

## 2020-01-15 RX ADMIN — ARFORMOTEROL TARTRATE 15 MCG: 15 SOLUTION RESPIRATORY (INHALATION) at 08:18

## 2020-01-15 RX ADMIN — HYDROCODONE BITARTRATE AND ACETAMINOPHEN 1 TABLET: 10; 325 TABLET ORAL at 21:14

## 2020-01-15 RX ADMIN — GLYCERIN 2 DROP: .002; .002; .01 SOLUTION/ DROPS OPHTHALMIC at 13:35

## 2020-01-15 RX ADMIN — ENOXAPARIN SODIUM 40 MG: 40 INJECTION SUBCUTANEOUS at 08:05

## 2020-01-15 RX ADMIN — HYDROMORPHONE HYDROCHLORIDE 1 MG: 1 INJECTION, SOLUTION INTRAMUSCULAR; INTRAVENOUS; SUBCUTANEOUS at 03:07

## 2020-01-15 RX ADMIN — GLYCERIN 2 DROP: .002; .002; .01 SOLUTION/ DROPS OPHTHALMIC at 21:14

## 2020-01-15 RX ADMIN — HYDROMORPHONE HYDROCHLORIDE 1 MG: 1 INJECTION, SOLUTION INTRAMUSCULAR; INTRAVENOUS; SUBCUTANEOUS at 10:12

## 2020-01-15 RX ADMIN — LEVETIRACETAM 750 MG: 100 INJECTION, SOLUTION INTRAVENOUS at 08:05

## 2020-01-15 RX ADMIN — TAZOBACTAM SODIUM AND PIPERACILLIN SODIUM 3.38 G: 375; 3 INJECTION, SOLUTION INTRAVENOUS at 03:25

## 2020-01-15 RX ADMIN — HYDROCODONE BITARTRATE AND ACETAMINOPHEN 1 TABLET: 10; 325 TABLET ORAL at 07:28

## 2020-01-15 RX ADMIN — CEFEPIME HYDROCHLORIDE 2 G: 2 INJECTION, POWDER, FOR SOLUTION INTRAVENOUS at 13:34

## 2020-01-15 RX ADMIN — HYDROCODONE BITARTRATE AND ACETAMINOPHEN 1 TABLET: 10; 325 TABLET ORAL at 13:43

## 2020-01-15 RX ADMIN — BUDESONIDE 0.5 MG: 0.5 SUSPENSION RESPIRATORY (INHALATION) at 08:17

## 2020-01-15 RX ADMIN — LEVETIRACETAM 750 MG: 500 TABLET, FILM COATED ORAL at 21:14

## 2020-01-15 RX ADMIN — QUETIAPINE FUMARATE 100 MG: 50 TABLET, EXTENDED RELEASE ORAL at 13:34

## 2020-01-15 RX ADMIN — POTASSIUM CHLORIDE 40 MEQ: 750 CAPSULE, EXTENDED RELEASE ORAL at 13:34

## 2020-01-15 RX ADMIN — LATANOPROST 1 DROP: 50 SOLUTION OPHTHALMIC at 09:40

## 2020-01-15 RX ADMIN — INSULIN LISPRO 2 UNITS: 100 INJECTION, SOLUTION INTRAVENOUS; SUBCUTANEOUS at 08:05

## 2020-01-15 NOTE — PLAN OF CARE
Remains in CCU as overflow pt because of bed availability.  Only Complaints are of generalized pain but especially lower abd pelvic area.  MD aware.  Dilaudid DC'd per MD.  Unsure if [place] will cover pain adequately. Good urine outpt.  ID consulted for Pseudomonas.

## 2020-01-15 NOTE — PROGRESS NOTES
Dr. HERLINDA Clark    [facility] CORONARY CARE    1/15/2020    Patient ID:  Name:  Yevgeniy Vang  MRN:  [id number]  1950  69 y.o.  male            CC/Reason for visit: Acute respiratory failure, sepsis, pyelonephritis     HPI: The patient is  complaining of generalized pain and is depressed and says that if he does not improve within 90 days he does not want to live any longer.  But he denies any particular plans or desires to hurt himself or others.  He has generalized pain but when I pressure him to be more specific he is unable to pinpoint where exactly he has pain.     ROS:  No fever, chills or hemoptysis    Vitals:  Vitals:    01/15/20 0700 01/15/20 0800 01/15/20 0817 01/15/20 1000   BP:  90/64  93/63   Pulse:  89 81 76   Resp:   18    Temp: 98.1 °F (36.7 °C)      TempSrc: Oral      SpO2:  (!) 86% 95% 97%   Weight:       Height:               Body mass index is 25.77 kg/m².    Intake/Output Summary (Last 24 hours) at 1/15/2020 1406  Last data filed at 1/15/2020 1100  Gross per 24 hour   Intake 2869.9 ml   Output 3050 ml   Net -180.1 ml       Exam:  GEN:  No distress  Alert, oriented x 3.   LUNGS: Clear breath sounds bilat, no use of accessory muscles  CV:  Normal S1S2, without murmur, no edema  ABD:  Non tender, no enlarged liver or masses      Scheduled meds:    arformoterol 15 mcg Nebulization BID - RT   budesonide 0.5 mg Nebulization Daily - RT   [START ON 1/16/2020] cefepime 2 g Intravenous Q12H   enoxaparin 40 mg Subcutaneous Q24H   insulin lispro 0-7 Units Subcutaneous 4x Daily With Meals & Nightly   latanoprost 1 drop Both Eyes Daily   levETIRAcetam 750 mg Intravenous Q12H   QUEtiapine  mg Oral Daily     IV meds:                           Data Review:   I reviewed the patient's medications and new clinical results.  Lab Results   Component Value Date    CALCIUM 8.6 01/15/2020    PHOS 3.3 01/15/2020    MG 2.3 01/14/2020    MG 2.4 01/12/2020     Results from last 7 days   Lab Units  01/15/20  0323 01/14/20  0527 01/13/20  0752 01/12/20 1942   SODIUM mmol/L 134* 139  --  140   POTASSIUM mmol/L 3.3* 3.4*  --  3.9   CHLORIDE mmol/L 100 104  --  99   CO2 mmol/L 25.2 22.4  --  24.5   BUN mg/dL 14 25*  --  38*   CREATININE mg/dL 1.62* 1.65* 2.42* 2.98*   CALCIUM mg/dL 8.6 8.0*  --  9.5   BILIRUBIN mg/dL  --   --   --  0.5   ALK PHOS U/L  --   --   --  78   ALT (SGPT) U/L  --   --   --  30   AST (SGOT) U/L  --   --   --  44*   GLUCOSE mg/dL 109* 98  --  128*   WBC 10*3/mm3  --  7.01  --  10.55   HEMOGLOBIN g/dL  --  8.0*  --  10.1*   PLATELETS 10*3/mm3  --  126*  --  113*   PROBNP pg/mL  --  613.5  --  2,104.0*   PROCALCITONIN ng/mL  --  21.68*  --   --      Results from last 7 days   Lab Units 01/12/20 2035 01/12/20 1943 01/12/20 1942   BLOODCX   --  No growth at 2 days No growth at 2 days   URINECX  >100,000 CFU/mL Pseudomonas aeruginosa MDRO*  --   --              ASSESSMENT:   Sepsis with acute hypoxic respiratory failure   1. Acute renal failure  2. Mild anemia with thrombocytopenia  3. Bilateral hydronephrosis with nephrolithiasis and suspected pyelonephritis  4. Aspiration pneumonia  5. Congestive heart failure  6. COPD, not exacerbated  7. Diabetes mellitus  8. Clinical depression  9. History of convulsive disorder      PLAN:  Due to the positive cultures for Pseudomonas from the urine, which are multidrug-resistant and his history of nephrolithiasis, we are requesting ID consultation.  Urology is already following.  We will discuss with him whether there are any plans for lithotripsy.  Unfortunately his urine seems to be colonized by the Pseudomonas, but on admission his procalcitonin was quite elevated and he was manifesting signs and symptoms of sepsis.  CT scan of the abdomen did include lower lung fields and there was no evidence of pneumonia.  Start mobilizing the patient out of bed with physical therapy.  Restart his Seroquel for his depression.  Advance patient's diet.  Transfer  out of CCU.          Jatinder Clark MD  1/15/2020

## 2020-01-15 NOTE — PLAN OF CARE
"No acute events overnight. VSS. Pt c/o feeling the need to void but not able to, bladder scanned, 310cc in bladder, pt immediately voided after bladder scan into condom cath, put out total of 1200cc urine this shift. Frequently c/o generalized pain, specifically pain in his \"private parts\". PRN pain medication given with good results. Redness/irritation in both eyes, worse in R eye. Transfer to tele when bed available.   "

## 2020-01-15 NOTE — CONSULTS
Referring Provider: Aman Escobar MD  [id number]  [address] [address]  Reason for Consultation: Pyelonephritis    Subjective   History of present illness: This is a 69-year-old male who is a nursing home resident with a history of COPD, type 2 diabetes, depression, dementia, recurrent UTIs in the setting of kidney stones who was admitted on January 12 with an acute on chronic respiratory failure.  Patient underwent right extracorporeal shockwave lithotripsy in August 2019.  2 days prior to admission he had a ureteral stents replaced. The day of admission the patient started reporting worsening short of breath.  He is normally on 2 L of oxygen via nasal cannula and was requiring 10 L nonrebreather to maintain his saturations in the low 90s.  Admission blood work revealed elevated creatinine ( 1.3 on 1/8) and a normal white blood cell count.  Urinalysis showed too numerous to count white and red blood cells.  CAT scan of the chest abdomen pelvis did not reveal any acute abnormalities in the lungs but showed bilateral ureteral stents with bilateral nephrolithiasis, mild hydronephrosis, and bilateral perinephric stranding right greater than left.  He was empirically started vancomycin and Zosyn.  He was switched to cefepime when his urine culture came back positive for MDRO Pseudomonas.  His blood cultures remain negative to date.  He remains afebrile.  His creatinine has improved.  Currently the patient reports significant pain in his pelvic area including suprapubic tenderness.  He states that this has been present for the last several weeks but cannot provide any further clarification.  He denies any dysuria but does report urinary urgency.  He states his breathing is back to baseline.  He denies any cough rhinorrhea or sore throat.  He denies any nausea vomiting or diarrhea.    Past Medical History:   Diagnosis Date   • Anemia    • CHF (congestive heart failure) (CMS/[facility])    • COPD (chronic  obstructive pulmonary disease) (CMS/[facility])    • Dementia (CMS/[facility])    • Depression    • Diabetes mellitus (CMS/[facility])    • GERD (gastroesophageal reflux disease)    • Guillain Barré syndrome (CMS/[facility])    • Hyperlipidemia    • Hypertension    • Insomnia    • Neuropathy    • Obstructive and reflux uropathy    • On home O2     2L   • Osteoporosis    • Stroke (CMS/[facility])    • Urinary retention    • DVT/PE    • Vitamin B12 deficiency        History reviewed. No pertinent surgical history.     has no tobacco, alcohol, and drug history on file.    family history is not on file.    No Known Allergies    Medication:  Antibiotics:  Cefepime 2 g IV every 12 hours    Please refer to the medical record for a full medication list    Review of Systems  Pertinent items are noted in HPI, all other systems reviewed and negative    Objective   Vital Signs   Temp:  [98.1 °F (36.7 °C)-99.9 °F (37.7 °C)] 98.1 °F (36.7 °C)  Heart Rate:  [70-92] 76  Resp:  [18-20] 18  BP: ()/(46-95) 93/63    Physical Exam:   General: In no acute distress  HEENT: Normocephalic, atraumatic, PERRL, EOMI, no scleral icterus.  Positive injection, oropharynx is clear and moist  Neck: Supple, trachea is midline  Cardiovascular: Normal rate, regular rhythm, martha S1 and S2, no murmurs, rubs, or gallops    Respiratory: Lungs are cleat to ascultation bilaterally, no wheezing   GI: Abdomen is soft, tender to palpation in the lower quadrants bilaterally, non-distended, positive bowel sounds bilaterally, no masses  Musculoskeletal: Normal range of motion, no edema, tenderness or deformity  Skin: No rashes   Extremities: no E/C/C  Neurological: Alert and oriented, decreased strength in the lower extremities  Psychiatric: Normal mood and affect     Results Review:   I reviewed the patient's new clinical results.  I reviewed the patient's new imaging results and agree with the interpretation.    Lab Results   Component Value Date    WBC 7.01 01/14/2020    HGB 8.0 (L)  01/14/2020    HCT 23.9 (L) 01/14/2020    MCV 90.5 01/14/2020     (L) 01/14/2020       Lab Results   Component Value Date    GLUCOSE 109 (H) 01/15/2020    BUN 14 01/15/2020    CREATININE 1.62 (H) 01/15/2020    EGFRIFNONA 21 (L) 01/12/2020    EGFRIFAFRI 51 (L) 01/15/2020    BCR 8.6 01/15/2020    CO2 25.2 01/15/2020    CALCIUM 8.6 01/15/2020    ALBUMIN 3.60 01/12/2020    LABIL2 1.0 (L) 06/24/2019    AST 44 (H) 01/12/2020    ALT 30 01/12/2020     BNP 2104 -> 613  Procalcitonin 21.68  Admission urinalysis large blood positive nitrite large leukocyte esterase too numerous to count red and white blood cells    Microbiology:  1/13 MRSA nasal PCR  1/12 RVP negative  1/12 UCx >100K MDRO pseudomonas   1/12 BCx NGTD x 2    Radiology:  Admission CAT scan of the abdomen pelvis shows bilateral nephrolithiasis.  Bilateral ureteral stents in place.  Large stone on the left.  Cluster of stones on the right.  Mild hydronephrosis bilaterally.  Perinephric inflammation and edema bilaterally right greater than left.  Nondistended gallbladder with tiny gallstones.    Admission CAT scan of the chest personally reviewed by me shows calcified old granulomatous disease.  Minimal left-sided pleural effusion.  No infiltrates    Assessment/Plan   1.  Pseudomonas pyelonephritis in the setting of 2 below  2.  Bilateral nephrolithiasis left greater than right with bilateral mild hydronephrosis status post bilateral ureteral stent placement  3.  Elevated procalcitonin  4  Acute on chronic kidney disease.   5.  Dementia    I agree that the patient is most likely colonized but given his presentation, suprapubic/pelvic tenderness as well as CT findings I think it would be prudent for us to treat him for Pseudomonas infection.  As long as he continues to have kidney stones with ureteral stent he is at risk for recurrent infection.  Unfortunately given the sensitivity pattern we are limited in her antibiotic choice.  I agree with treating him with  cefepime 2 g IV every 12 hours and would recommend a 10-day course of antibiotics with an antibiotic stop date of January 24.  The patient will need a PICC line but would hold off until his blood cultures are negative for at least 24 hours.  Given his elevated procalcitonin I am concerned about possible bacteremia.  Last the microbiology lab to obtain fosfomycin sensitivities as this would be a potential antibiotic we can suppress him with until a more definitive urological procedure can be done.    I discussed the patients findings and my recommendations with patient and nursing staff

## 2020-01-15 NOTE — CONSULTS
"[facility] evaluated pt for depression. Pt has had depressed mood and is on Cymbalta, Trazadone and Seroquel XR. Pt has not had these meds since being admitted. Access talked with nurse who conveyed info to doctor. Pt states he is not sure when these meds were prescribed but does not feel they are doing much for him. Pt states he is not feeling well and prays \"Why me Lord? I'm tired. I hope I don't wake up.\" Pt denies having any intent or plan to harm self. Pt states he was dx with Guillain barre syndrome 17 yrs ago. Pt states he took an OD to kill self back then and went to [facility]. Pt states he went to the nursing home from there and has been there since. Pt rates his anxiety and depressed mood as a \"8\". Pt states he smokes 6 cigarettes a day during smoke breaks at NH and it helps him feel better. It is also a social time. Pt states he loves to fish and he will go fishing with his adult children. Pt states he takes Tarc 3 whenever he wants to go visit with his children. Pt states he has 8 adult children and 20 grandchildren. Pt states he has never  but all of his children are from the same woman. He states he and she are friends but not together.     Pt states he is not a good sleeper and his appetite has been poor. Pt denies psychosis. Pt states he wishes the \"lord\" would take him but has no plan or intent to harm self. Pt states he prays often and reads his bible.    Access will follow. Pt's doctor will evaluate psych meds. Psychiatrist consult is an option if needed.  "

## 2020-01-16 LAB
ANION GAP SERPL CALCULATED.3IONS-SCNC: 12.2 MMOL/L (ref 5–15)
BACTERIA SPEC AEROBE CULT: ABNORMAL
BASOPHILS # BLD AUTO: 0.03 10*3/MM3 (ref 0–0.2)
BASOPHILS NFR BLD AUTO: 0.5 % (ref 0–1.5)
BUN BLD-MCNC: 9 MG/DL (ref 8–23)
BUN/CREAT SERPL: 7 (ref 7–25)
CALCIUM SPEC-SCNC: 8.8 MG/DL (ref 8.6–10.5)
CHLORIDE SERPL-SCNC: 104 MMOL/L (ref 98–107)
CO2 SERPL-SCNC: 24.8 MMOL/L (ref 22–29)
CREAT BLD-MCNC: 1.28 MG/DL (ref 0.76–1.27)
DEPRECATED RDW RBC AUTO: 42.8 FL (ref 37–54)
EOSINOPHIL # BLD AUTO: 0.24 10*3/MM3 (ref 0–0.4)
EOSINOPHIL NFR BLD AUTO: 3.8 % (ref 0.3–6.2)
ERYTHROCYTE [DISTWIDTH] IN BLOOD BY AUTOMATED COUNT: 12.9 % (ref 12.3–15.4)
GFR SERPL CREATININE-BSD FRML MDRD: 68 ML/MIN/1.73
GLUCOSE BLD-MCNC: 112 MG/DL (ref 65–99)
GLUCOSE BLDC GLUCOMTR-MCNC: 103 MG/DL (ref 70–130)
GLUCOSE BLDC GLUCOMTR-MCNC: 150 MG/DL (ref 70–130)
GLUCOSE BLDC GLUCOMTR-MCNC: 97 MG/DL (ref 70–130)
GLUCOSE BLDC GLUCOMTR-MCNC: 97 MG/DL (ref 70–130)
HCT VFR BLD AUTO: 24.4 % (ref 37.5–51)
HGB BLD-MCNC: 8.1 G/DL (ref 13–17.7)
IMM GRANULOCYTES # BLD AUTO: 0.02 10*3/MM3 (ref 0–0.05)
IMM GRANULOCYTES NFR BLD AUTO: 0.3 % (ref 0–0.5)
LYMPHOCYTES # BLD AUTO: 1.92 10*3/MM3 (ref 0.7–3.1)
LYMPHOCYTES NFR BLD AUTO: 30.5 % (ref 19.6–45.3)
MCH RBC QN AUTO: 30.5 PG (ref 26.6–33)
MCHC RBC AUTO-ENTMCNC: 33.2 G/DL (ref 31.5–35.7)
MCV RBC AUTO: 91.7 FL (ref 79–97)
MONOCYTES # BLD AUTO: 1.02 10*3/MM3 (ref 0.1–0.9)
MONOCYTES NFR BLD AUTO: 16.2 % (ref 5–12)
NEUTROPHILS # BLD AUTO: 3.07 10*3/MM3 (ref 1.7–7)
NEUTROPHILS NFR BLD AUTO: 48.7 % (ref 42.7–76)
NRBC BLD AUTO-RTO: 0 /100 WBC (ref 0–0.2)
PLATELET # BLD AUTO: 149 10*3/MM3 (ref 140–450)
PMV BLD AUTO: 9.8 FL (ref 6–12)
POTASSIUM BLD-SCNC: 3.7 MMOL/L (ref 3.5–5.2)
PROCALCITONIN SERPL-MCNC: 5.56 NG/ML (ref 0.1–0.25)
RBC # BLD AUTO: 2.66 10*6/MM3 (ref 4.14–5.8)
SODIUM BLD-SCNC: 141 MMOL/L (ref 136–145)
TROPONIN T SERPL-MCNC: <0.01 NG/ML (ref 0–0.03)
TROPONIN T SERPL-MCNC: <0.01 NG/ML (ref 0–0.03)
WBC NRBC COR # BLD: 6.3 10*3/MM3 (ref 3.4–10.8)

## 2020-01-16 PROCEDURE — 80048 BASIC METABOLIC PNL TOTAL CA: CPT | Performed by: INTERNAL MEDICINE

## 2020-01-16 PROCEDURE — 97162 PT EVAL MOD COMPLEX 30 MIN: CPT

## 2020-01-16 PROCEDURE — 97110 THERAPEUTIC EXERCISES: CPT

## 2020-01-16 PROCEDURE — 85025 COMPLETE CBC W/AUTO DIFF WBC: CPT | Performed by: INTERNAL MEDICINE

## 2020-01-16 PROCEDURE — 94799 UNLISTED PULMONARY SVC/PX: CPT

## 2020-01-16 PROCEDURE — 93010 ELECTROCARDIOGRAM REPORT: CPT | Performed by: INTERNAL MEDICINE

## 2020-01-16 PROCEDURE — 93005 ELECTROCARDIOGRAM TRACING: CPT | Performed by: INTERNAL MEDICINE

## 2020-01-16 PROCEDURE — 25010000002 ENOXAPARIN PER 10 MG: Performed by: INTERNAL MEDICINE

## 2020-01-16 PROCEDURE — 82962 GLUCOSE BLOOD TEST: CPT

## 2020-01-16 PROCEDURE — 25010000002 CEFEPIME PER 500 MG: Performed by: INTERNAL MEDICINE

## 2020-01-16 PROCEDURE — 84484 ASSAY OF TROPONIN QUANT: CPT | Performed by: INTERNAL MEDICINE

## 2020-01-16 PROCEDURE — 84145 PROCALCITONIN (PCT): CPT | Performed by: INTERNAL MEDICINE

## 2020-01-16 RX ORDER — BENZONATATE 100 MG/1
200 CAPSULE ORAL EVERY 8 HOURS PRN
Status: DISCONTINUED | OUTPATIENT
Start: 2020-01-16 | End: 2020-01-19 | Stop reason: HOSPADM

## 2020-01-16 RX ORDER — GUAIFENESIN AND DEXTROMETHORPHAN HYDROBROMIDE 600; 30 MG/1; MG/1
1 TABLET, EXTENDED RELEASE ORAL 2 TIMES DAILY
Status: DISCONTINUED | OUTPATIENT
Start: 2020-01-16 | End: 2020-01-19 | Stop reason: HOSPADM

## 2020-01-16 RX ORDER — NITROGLYCERIN 0.4 MG/1
0.4 TABLET SUBLINGUAL
Status: DISCONTINUED | OUTPATIENT
Start: 2020-01-16 | End: 2020-01-19 | Stop reason: HOSPADM

## 2020-01-16 RX ORDER — NITROGLYCERIN 0.4 MG/1
TABLET SUBLINGUAL
Status: COMPLETED
Start: 2020-01-16 | End: 2020-01-16

## 2020-01-16 RX ORDER — QUETIAPINE FUMARATE 50 MG/1
100 TABLET, EXTENDED RELEASE ORAL NIGHTLY
Status: DISCONTINUED | OUTPATIENT
Start: 2020-01-16 | End: 2020-01-18

## 2020-01-16 RX ADMIN — NITROGLYCERIN 0.4 MG: 0.4 TABLET SUBLINGUAL at 06:29

## 2020-01-16 RX ADMIN — QUETIAPINE FUMARATE 100 MG: 50 TABLET, EXTENDED RELEASE ORAL at 08:41

## 2020-01-16 RX ADMIN — HYDROCODONE BITARTRATE AND ACETAMINOPHEN 1 TABLET: 10; 325 TABLET ORAL at 12:08

## 2020-01-16 RX ADMIN — ENOXAPARIN SODIUM 40 MG: 40 INJECTION SUBCUTANEOUS at 08:41

## 2020-01-16 RX ADMIN — LEVETIRACETAM 750 MG: 500 TABLET, FILM COATED ORAL at 21:05

## 2020-01-16 RX ADMIN — CEFEPIME HYDROCHLORIDE 2 G: 2 INJECTION, POWDER, FOR SOLUTION INTRAVENOUS at 12:07

## 2020-01-16 RX ADMIN — IPRATROPIUM BROMIDE AND ALBUTEROL SULFATE 3 ML: 2.5; .5 SOLUTION RESPIRATORY (INHALATION) at 14:10

## 2020-01-16 RX ADMIN — GUAIFENESIN AND DEXTROMETHORPHAN HYDROBROMIDE 1 TABLET: 600; 30 TABLET, EXTENDED RELEASE ORAL at 21:05

## 2020-01-16 RX ADMIN — Medication 5 MG: at 00:26

## 2020-01-16 RX ADMIN — ARFORMOTEROL TARTRATE 15 MCG: 15 SOLUTION RESPIRATORY (INHALATION) at 06:03

## 2020-01-16 RX ADMIN — LATANOPROST 1 DROP: 50 SOLUTION OPHTHALMIC at 08:40

## 2020-01-16 RX ADMIN — CEFEPIME HYDROCHLORIDE 2 G: 2 INJECTION, POWDER, FOR SOLUTION INTRAVENOUS at 01:00

## 2020-01-16 RX ADMIN — BUDESONIDE 0.5 MG: 0.5 SUSPENSION RESPIRATORY (INHALATION) at 06:03

## 2020-01-16 RX ADMIN — HYDROCODONE BITARTRATE AND ACETAMINOPHEN 1 TABLET: 10; 325 TABLET ORAL at 06:20

## 2020-01-16 RX ADMIN — HYDROCODONE BITARTRATE AND ACETAMINOPHEN 1 TABLET: 10; 325 TABLET ORAL at 21:05

## 2020-01-16 RX ADMIN — ARFORMOTEROL TARTRATE 15 MCG: 15 SOLUTION RESPIRATORY (INHALATION) at 23:26

## 2020-01-16 RX ADMIN — LEVETIRACETAM 750 MG: 500 TABLET, FILM COATED ORAL at 08:41

## 2020-01-16 RX ADMIN — CEFEPIME HYDROCHLORIDE 2 G: 2 INJECTION, POWDER, FOR SOLUTION INTRAVENOUS at 21:08

## 2020-01-16 RX ADMIN — QUETIAPINE FUMARATE 100 MG: 50 TABLET, EXTENDED RELEASE ORAL at 21:05

## 2020-01-16 NOTE — THERAPY EVALUATION
Patient Name: Yevgeniy Vang  : 1950    MRN: [id number]                              Today's Date: 2020       Admit Date: 2020    Visit Dx:     ICD-10-CM ICD-9-CM   1. Sepsis with acute hypoxic respiratory failure, due to unspecified organism, unspecified whether septic shock present (CMS/[facility]) A41.9 038.9    R65.20 995.91    J96.01 518.81   2. Acute UTI N39.0 599.0   3. Thrombocytopenia (CMS/[facility]) D69.6 287.5   4. Anemia, chronic disease D63.8 285.29   5. Acute renal failure, unspecified acute renal failure type (CMS/[facility]) N17.9 584.9   6. Acute and chronic respiratory failure with hypoxia (CMS/HCC) J96.21 518.84     799.02   7. Chronic obstructive pulmonary disease, unspecified COPD type (CMS/[facility]) J44.9 496   8. Nephrolithiasis N20.0 592.0     Patient Active Problem List   Diagnosis   • Sepsis with acute hypoxic respiratory failure (CMS/[facility])     Past Medical History:   Diagnosis Date   • Anemia    • Anorexia    • Anorexia    • Arthritis    • BPH (benign prostatic hyperplasia)    • CHF (congestive heart failure) (CMS/[facility])    • Constipation    • Convulsion (CMS/[facility])    • COPD (chronic obstructive pulmonary disease) (CMS/[facility])    • Dementia (CMS/[facility])    • Depression    • Diabetes mellitus (CMS/[facility])    • GERD (gastroesophageal reflux disease)    • Glaucoma    • Guillain Barré syndrome (CMS/[facility])    • Hemiplegia affecting nondominant side (CMS/[facility])    • Hyperlipidemia    • Hypertension    • Hypokalemia    • Insomnia    • Neuropathy    • Obstructive and reflux uropathy    • On home O2     2L   • Osteoporosis    • Stroke (CMS/[facility])    • Urinary retention    • Venous thrombosis and embolism    • Vitamin B12 deficiency      History reviewed. No pertinent surgical history.  General Information     Row Name 20 1238          PT Evaluation Time/Intention    Document Type  evaluation  -AR     Mode of Treatment  physical therapy  -AR     Row Name 20 1238          General Information    Patient Profile  Reviewed?  yes  -AR     Prior Level of Function  max assist:;dependent: admit from facility, reports not being up in WC for few wks due to weakness and not wanting to   -AR     Existing Precautions/Restrictions  fall  -AR     Barriers to Rehab  previous functional deficit  -AR     Row Name 01/16/20 1238          Relationship/Environment    Lives With  facility resident  -AR     Row Name 01/16/20 1238          Cognitive Assessment/Intervention- PT/OT    Orientation Status (Cognition)  oriented to;person;place  -AR     Row Name 01/16/20 1238          Safety Issues, Functional Mobility    Safety Issues Affecting Function (Mobility)  insight into deficits/self awareness;judgment  -AR       User Key  (r) = Recorded By, (t) = Taken By, (c) = Cosigned By    Initials Name Provider Type    Natalie Ro, PT Physical Therapist        Mobility     Row Name 01/16/20 1239          Bed Mobility Assessment/Treatment    Bed Mobility Assessment/Treatment  supine-sit;sit-supine  -AR     Supine-Sit [place] (Bed Mobility)  maximum assist (25% patient effort)  -AR     Sit-Supine [place] (Bed Mobility)  2 person assist  -AR     Assistive Device (Bed Mobility)  bed rails;draw sheet;head of bed elevated  -AR     Comment (Bed Mobility)  education and encouragement throughout eval  -AR     Row Name 01/16/20 1239          Transfer Assessment/Treatment    Comment (Transfers)  pt reports hasn't walked or stood in months, not appropriate to attempt at this time  -AR       User Key  (r) = Recorded By, (t) = Taken By, (c) = Cosigned By    Initials Name Provider Type    Natalie Ro, PT Physical Therapist        Obj/Interventions     Row Name 01/16/20 1240          General ROM    GENERAL ROM COMMENTS  B LE w/ limited ROM on assessment, pt reports he will not try to move 2/2 feeling cold, and declined to let PT assess PROM.    -AR     Row Name 01/16/20 1240          MMT (Manual Muscle Testing)    General MMT Comments  B LE at  least 2/5, declined MMT   -AR     Row Name 01/16/20 1240          Therapeutic Exercise    Comment (Therapeutic Exercise)  10x ankle pumps, declined to attempt LAQ  -AR     Row Name 01/16/20 1240          Static Sitting Balance    Level of [place] (Unsupported Sitting, Static Balance)  moderate assist, 50 to 74% patient effort  -AR     Sitting Position (Unsupported Sitting, Static Balance)  sitting on edge of bed  -AR     Time Able to Maintain Position (Unsupported Sitting, Static Balance)  3 to 4 minutes  -AR       User Key  (r) = Recorded By, (t) = Taken By, (c) = Cosigned By    Initials Name Provider Type    AR Natalie Freire, PT Physical Therapist        Goals/Plan     Row Name 01/16/20 1247          Bed Mobility Goal 1 (PT)    Activity/Assistive Device (Bed Mobility Goal 1, PT)  sit to supine/supine to sit  -AR     [place] Level/Cues Needed (Bed Mobility Goal 1, PT)  moderate assist (50-74% patient effort)  -AR     Time Frame (Bed Mobility Goal 1, PT)  1 week  -AR       User Key  (r) = Recorded By, (t) = Taken By, (c) = Cosigned By    Initials Name Provider Type    AR Natalie Freire, PT Physical Therapist        Clinical Impression     Row Name 01/16/20 1242          Pain Assessment    Additional Documentation  Pain Scale: Numbers Pre/Post-Treatment (Group)  -AR     Row Name 01/16/20 1242          Pain Scale: Numbers Pre/Post-Treatment    Pre/Post Treatment Pain Comment  pt denies pain but reports several times he doesn't feel good.    -AR     Pain Intervention(s)  Repositioned  -AR     Row Name 01/16/20 1242          Plan of Care Review    Plan of Care Reviewed With  patient  -AR     Outcome Summary  Pt admitted 1/12 from facility with sepsis.  Pt is difficult historian but reports not walking in months, hasn't been up into WC for a few weeks because he hasn't felt good and doesn't want to.  Today pt demonstrates generalized weakness, impaired sitting balance and independence w/ mobility but able  to sit on edge of bed w/ maximal assist x2.  Moderate assist for static sitting balance.  Education and encouragement provided throughout PT.  Recommend DC to SNU/ECF.    -AR     Row Name 01/16/20 1242          Physical Therapy Clinical Impression    Patient/Family Goals Statement (PT Clinical Impression)  Pt wants to rest  -AR     Criteria for Skilled Interventions Met (PT Clinical Impression)  yes  -AR     Rehab Potential (PT Clinical Summary)  fair, will monitor progress closely  -AR     Row Name 01/16/20 1242          Vital Signs    O2 Delivery Pre Treatment  room air nasal cannula on but in bed, SP02 92%  -AR     O2 Delivery Intra Treatment  room air  -AR     O2 Delivery Post Treatment  room air  -AR     Row Name 01/16/20 1242          Positioning and Restraints    Pre-Treatment Position  in bed  -AR     Post Treatment Position  bed  -AR     In Bed  notified nsg;supine;call light within reach;encouraged to call for assist;exit alarm on  -AR       User Key  (r) = Recorded By, (t) = Taken By, (c) = Cosigned By    Initials Name Provider Type    Natalie Ro, PT Physical Therapist        Outcome Measures     Row Name 01/16/20 1248          How much help from another person do you currently need...    Turning from your back to your side while in flat bed without using bedrails?  2  -AR     Moving from lying on back to sitting on the side of a flat bed without bedrails?  2  -AR     Moving to and from a bed to a chair (including a wheelchair)?  1  -AR     Standing up from a chair using your arms (e.g., wheelchair, bedside chair)?  1  -AR     Climbing 3-5 steps with a railing?  1  -AR     To walk in hospital room?  1  -AR     AM-PAC 6 Clicks Score (PT)  8  -AR     Row Name 01/16/20 1248          Functional Assessment    Outcome Measure Options  AM-PAC 6 Clicks Basic Mobility (PT)  -AR       User Key  (r) = Recorded By, (t) = Taken By, (c) = Cosigned By    Initials Name Provider Type    Natalie Ro, PT  Physical Therapist          PT Recommendation and Plan  Planned Therapy Interventions (PT Eval): balance training, bed mobility training, gait training, home exercise program, patient/family education, transfer training, strengthening, ROM (range of motion)  Outcome Summary/Treatment Plan (PT)  Anticipated Discharge Disposition (PT): skilled nursing facility, extended care facility  Plan of Care Reviewed With: patient  Outcome Summary: Pt admitted 1/12 from facility with sepsis.  Pt is difficult historian but reports not walking in months, hasn't been up into WC for a few weeks because he hasn't felt good and doesn't want to.  Today pt demonstrates generalized weakness, impaired sitting balance and independence w/ mobility but able to sit on edge of bed w/ maximal assist x2.  Moderate assist for static sitting balance.  Education and encouragement provided throughout PT.  Recommend DC to SNU/ECF.       Time Calculation:   PT Charges     Row Name 01/16/20 1237             Time Calculation    Start Time  1142  -AR      Stop Time  1203  -AR      Time Calculation (min)  21 min  -AR      PT Received On  01/16/20  -AR      PT - Next Appointment  01/21/20  -AR      PT Goal Re-Cert Due Date  01/30/20  -AR        User Key  (r) = Recorded By, (t) = Taken By, (c) = Cosigned By    Initials Name Provider Type    AR Natalie Freire PT Physical Therapist        Therapy Charges for Today     Code Description Service Date Service Provider Modifiers Qty    55682329619 HC PT EVAL MOD COMPLEXITY 2 1/16/2020 Natalie Freire, PT GP 1    08040048826 HC PT THER PROC EA 15 MIN 1/16/2020 Natalie Freire, PT GP 1    03403602817 HC PT THER SUPP EA 15 MIN 1/16/2020 Natalie Freire, PT GP 2          PT G-Codes  Outcome Measure Options: AM-PAC 6 Clicks Basic Mobility (PT)  AM-PAC 6 Clicks Score (PT): 8    Natalie Freire PT  1/16/2020

## 2020-01-16 NOTE — PROGRESS NOTES
Continued Stay Note  Psychiatric     Patient Name: Yevgeniy Vang  MRN: [id number]  Today's Date: 1/16/2020    Admit Date: 1/12/2020    Discharge Plan     Row Name 01/16/20 1427       Plan    Plan  From [facility]    Patient/Family in Agreement with Plan  yes    Plan Comments  Left message for Cherise Maximo dtg [phone] to discuss discharge options, patient is from [facility] and per Becka he is a Medicaid bed hold, still has 10 bed hold days left.  It will be very challenging to find an alternative facility that has Medicaid beds available. Will continue to monitor for new or changing discharge needs.        Discharge Codes    No documentation.             Penny Govea RN

## 2020-01-16 NOTE — NURSING NOTE
"Pt c/o SOA and chest pain around 0600. O2 was turned up to see if pt would have relief, none present. STAT EKG, troponin, and breathing treatment ordered and complete. Pt states that the pain is still 8/10. Called and spoke with Dr. Escobar, ordered STAT nitro, administered. When asked about chest pain after administation, pt stated \"I don't know\". Pt stated that pain is only present when coughing. Called and spoke with Dr. Escobar, see new orders. Pt now states breathing and pain is better. A&Ox4. VSS. Will continue to monitor.  "

## 2020-01-16 NOTE — NURSING NOTE
AC Follow Up: Patient was sound asleep in room so did not disturb.     Reviewed chart, It appears that patient hadn't received his psychotropic medications since admission. Orders show where Dr. Clark has reordered the Seroquel XR.     It appears that his Trazodone and Cymbalta has still not been addressed.

## 2020-01-16 NOTE — PLAN OF CARE
Problem: Patient Care Overview  Goal: Plan of Care Review  Outcome: Ongoing (interventions implemented as appropriate)  Flowsheets (Taken 1/16/2020 2660)  Outcome Summary: Pt is alert and oriented.  He is cooperative, but zhao.  IV antibiotics continue.  Medicated for pain per order.  VSS.  Pt evaluated.  Will continue to monitor.  Goal: Individualization and Mutuality  Outcome: Ongoing (interventions implemented as appropriate)  Goal: Discharge Needs Assessment  Outcome: Ongoing (interventions implemented as appropriate)  Goal: Interprofessional Rounds/Family Conf  Outcome: Ongoing (interventions implemented as appropriate)     Problem: Fall Risk (Adult)  Goal: Absence of Fall  Outcome: Ongoing (interventions implemented as appropriate)     Problem: Pain, Chronic (Adult)  Goal: Acceptable Pain/Comfort Level and Functional Ability  Outcome: Ongoing (interventions implemented as appropriate)     Problem: Skin Injury Risk (Adult)  Goal: Skin Health and Integrity  Outcome: Ongoing (interventions implemented as appropriate)     Problem: Suicide Risk (Adult)  Goal: Strength-Based Wellness/Recovery  Outcome: Ongoing (interventions implemented as appropriate)  Goal: Physical Safety  Outcome: Ongoing (interventions implemented as appropriate)

## 2020-01-16 NOTE — PLAN OF CARE
Problem: Patient Care Overview  Goal: Plan of Care Review  Flowsheets (Taken 1/16/2020 1242)  Outcome Summary: Pt admitted 1/12 from facility with sepsis.  Pt is difficult historian but reports not walking in months, hasn't been up into WC for a few weeks because he hasn't felt good and doesn't want to.  Today pt demonstrates generalized weakness, impaired sitting balance and independence w/ mobility but able to sit on edge of bed w/ maximal assist x2.  Moderate assist for static sitting balance.  Education and encouragement provided throughout PT.  Recommend DC to SNU/ECF.

## 2020-01-16 NOTE — PLAN OF CARE
Problem: Patient Care Overview  Goal: Plan of Care Review  Outcome: Ongoing (interventions implemented as appropriate)  Flowsheets (Taken 1/16/2020 0964)  Progress: no change  Plan of Care Reviewed With: patient  Outcome Summary: Pt transferred to unit from CCU during shift. Pt c/o generalized pain, with movement. Medicated with PRN pain medication. A&Ox4. PRN sleeping medication ordered per pt request. Turning in bed. Waffle boots in place. Medications administered per orders. urinal within reach. VSS. No s/s of distress at this time. Will continue to monitor.  Goal: Individualization and Mutuality  Outcome: Ongoing (interventions implemented as appropriate)  Goal: Discharge Needs Assessment  Outcome: Ongoing (interventions implemented as appropriate)  Goal: Interprofessional Rounds/Family Conf  Outcome: Ongoing (interventions implemented as appropriate)     Problem: Fall Risk (Adult)  Goal: Absence of Fall  Outcome: Ongoing (interventions implemented as appropriate)     Problem: Pain, Chronic (Adult)  Goal: Acceptable Pain/Comfort Level and Functional Ability  Outcome: Ongoing (interventions implemented as appropriate)     Problem: Skin Injury Risk (Adult)  Goal: Skin Health and Integrity  Outcome: Ongoing (interventions implemented as appropriate)     Problem: Suicide Risk (Adult)  Goal: Strength-Based Wellness/Recovery  Outcome: Ongoing (interventions implemented as appropriate)  Goal: Physical Safety  Outcome: Ongoing (interventions implemented as appropriate)

## 2020-01-16 NOTE — PROGRESS NOTES
Dr. HERLINDA Clark    [address]    1/16/2020    Patient ID:  Name:  Yevgeniy Vang  MRN:  [id number]  1950  69 y.o.  male            CC/Reason for visit: Acute respiratory failure, sepsis, pyelonephritis     HPI: The patient is complaining about chronic pain in back.  That is his daily complaint.  He is calm, pleasant and he is watching TV with no signs or symptoms of distress..     ROS: He denies chest pain.  No fever or chills.  He has a cough    Vitals:  Vitals:    01/16/20 0618 01/16/20 0629 01/16/20 0636 01/16/20 0719   BP:  116/72 100/75 111/59   Pulse: 77 79 86 82   Resp: 18   16   Temp:    98.9 °F (37.2 °C)   TempSrc:    Oral   SpO2: 100% 99% 98% 97%   Weight:       Height:               Body mass index is 24.22 kg/m².    Intake/Output Summary (Last 24 hours) at 1/16/2020 1403  Last data filed at 1/16/2020 0254  Gross per 24 hour   Intake 1324 ml   Output 1450 ml   Net -126 ml       Exam:  GEN:  No distress  Alert, oriented x 3.   LUNGS: Scattered rhonchi bilat, no use of accessory muscles  CV:  Normal S1S2, without murmur, 1+ ankle edema  ABD:  Non tender, no enlarged liver or masses      Scheduled meds:    arformoterol 15 mcg Nebulization BID - RT   budesonide 0.5 mg Nebulization Daily - RT   cefepime 2 g Intravenous Q8H   enoxaparin 40 mg Subcutaneous Q24H   guaifenesin-dextromethorphan 1 tablet Oral BID   insulin lispro 0-7 Units Subcutaneous 4x Daily With Meals & Nightly   latanoprost 1 drop Both Eyes Daily   levETIRAcetam 750 mg Oral BID   QUEtiapine  mg Oral Daily     IV meds:                           Data Review:   I reviewed the patient's medications and new clinical results.  Lab Results   Component Value Date    CALCIUM 8.8 01/16/2020    PHOS 3.3 01/15/2020    MG 2.3 01/14/2020    MG 2.4 01/12/2020     Results from last 7 days   Lab Units 01/16/20  0449 01/15/20  0323 01/14/20  0527  01/12/20  1942   SODIUM mmol/L 141 134* 139  --  140   POTASSIUM mmol/L 3.7 3.3* 3.4*   --  3.9   CHLORIDE mmol/L 104 100 104  --  99   CO2 mmol/L 24.8 25.2 22.4  --  24.5   BUN mg/dL 9 14 25*  --  38*   CREATININE mg/dL 1.28* 1.62* 1.65*   < > 2.98*   CALCIUM mg/dL 8.8 8.6 8.0*  --  9.5   BILIRUBIN mg/dL  --   --   --   --  0.5   ALK PHOS U/L  --   --   --   --  78   ALT (SGPT) U/L  --   --   --   --  30   AST (SGOT) U/L  --   --   --   --  44*   GLUCOSE mg/dL 112* 109* 98  --  128*   WBC 10*3/mm3 6.30  --  7.01  --  10.55   HEMOGLOBIN g/dL 8.1*  --  8.0*  --  10.1*   PLATELETS 10*3/mm3 149  --  126*  --  113*   PROBNP pg/mL  --   --  613.5  --  2,104.0*   PROCALCITONIN ng/mL 5.56*  --  21.68*  --   --     < > = values in this interval not displayed.     Results from last 7 days   Lab Units 01/12/20 2035 01/12/20 1943 01/12/20 1942   BLOODCX   --  No growth at 3 days No growth at 3 days   URINECX  >100,000 CFU/mL Pseudomonas aeruginosa MDRO*  --   --          Results from last 7 days   Lab Units 01/16/20  0658 01/16/20  0449 01/12/20 1942   TROPONIN T ng/mL <0.010 <0.010 0.030     Results from last 7 days   Lab Units 01/12/20 2004   PH, ARTERIAL pH units 7.341*   PCO2, ARTERIAL mm Hg 47.8*   PO2 ART mm Hg 75.8*   FLOW RATE lpm 7   MODALITY  Cannula   O2 SATURATION CALC % 94.0       Estimated Creatinine Clearance: 62.4 mL/min (A) (by C-G formula based on SCr of 1.28 mg/dL (H)).      ASSESSMENT:   1. Sepsis due to pyelonephritis caused by Pseudomonas, multidrug resistant  2. Sepsis causing acute respiratory failure  3. Sepsis causing encephalopathy  4. Sepsis causing acute kidney injury acute renal failure  5. Mild anemia with thrombocytopenia  6. Bilateral hydronephrosis with nephrolithiasis and suspected pyelonephritis  7. Aspiration pneumonia  8. Congestive heart failure  9. COPD, not exacerbated  10. Diabetes mellitus  11. Clinical depression  12. History of convulsive disorder      PLAN:  His sepsis has improved.  Continue cefepime as per infectious disease recommendations.  Place PICC line  for long-term antibiotic infusions tomorrow.  His encephalopathy has improved.  His kidney injury is also improving.  He still requiring oxygen and he is coughing.  We will continue nebulized bronchodilators for maintenance of COPD.  Start mobilizing patient with physical therapy.  Urology to decide when patient will have additional procedures for removal of renal stones.      Jatinder Clark MD  1/16/2020

## 2020-01-17 ENCOUNTER — APPOINTMENT (OUTPATIENT)
Dept: GENERAL RADIOLOGY | Facility: HOSPITAL | Age: 70
End: 2020-01-17

## 2020-01-17 LAB
ANION GAP SERPL CALCULATED.3IONS-SCNC: 13 MMOL/L (ref 5–15)
BACTERIA SPEC AEROBE CULT: NORMAL
BACTERIA SPEC AEROBE CULT: NORMAL
BUN BLD-MCNC: 10 MG/DL (ref 8–23)
BUN/CREAT SERPL: 8.9 (ref 7–25)
CALCIUM SPEC-SCNC: 8.8 MG/DL (ref 8.6–10.5)
CHLORIDE SERPL-SCNC: 101 MMOL/L (ref 98–107)
CO2 SERPL-SCNC: 22 MMOL/L (ref 22–29)
CREAT BLD-MCNC: 1.12 MG/DL (ref 0.76–1.27)
GFR SERPL CREATININE-BSD FRML MDRD: 79 ML/MIN/1.73
GLUCOSE BLD-MCNC: 92 MG/DL (ref 65–99)
GLUCOSE BLDC GLUCOMTR-MCNC: 103 MG/DL (ref 70–130)
GLUCOSE BLDC GLUCOMTR-MCNC: 110 MG/DL (ref 70–130)
GLUCOSE BLDC GLUCOMTR-MCNC: 93 MG/DL (ref 70–130)
GLUCOSE BLDC GLUCOMTR-MCNC: 96 MG/DL (ref 70–130)
POTASSIUM BLD-SCNC: 3.7 MMOL/L (ref 3.5–5.2)
SODIUM BLD-SCNC: 136 MMOL/L (ref 136–145)
TROPONIN T SERPL-MCNC: <0.01 NG/ML (ref 0–0.03)

## 2020-01-17 PROCEDURE — 99232 SBSQ HOSP IP/OBS MODERATE 35: CPT | Performed by: INTERNAL MEDICINE

## 2020-01-17 PROCEDURE — 84484 ASSAY OF TROPONIN QUANT: CPT | Performed by: INTERNAL MEDICINE

## 2020-01-17 PROCEDURE — 93010 ELECTROCARDIOGRAM REPORT: CPT | Performed by: INTERNAL MEDICINE

## 2020-01-17 PROCEDURE — 82962 GLUCOSE BLOOD TEST: CPT

## 2020-01-17 PROCEDURE — 25010000002 CEFEPIME PER 500 MG: Performed by: INTERNAL MEDICINE

## 2020-01-17 PROCEDURE — 94799 UNLISTED PULMONARY SVC/PX: CPT

## 2020-01-17 PROCEDURE — 80048 BASIC METABOLIC PNL TOTAL CA: CPT | Performed by: INTERNAL MEDICINE

## 2020-01-17 PROCEDURE — 71045 X-RAY EXAM CHEST 1 VIEW: CPT

## 2020-01-17 PROCEDURE — 93005 ELECTROCARDIOGRAM TRACING: CPT | Performed by: INTERNAL MEDICINE

## 2020-01-17 PROCEDURE — 25010000002 ENOXAPARIN PER 10 MG: Performed by: INTERNAL MEDICINE

## 2020-01-17 RX ADMIN — LEVETIRACETAM 750 MG: 500 TABLET, FILM COATED ORAL at 08:25

## 2020-01-17 RX ADMIN — HYDROCODONE BITARTRATE AND ACETAMINOPHEN 1 TABLET: 10; 325 TABLET ORAL at 12:15

## 2020-01-17 RX ADMIN — GUAIFENESIN AND DEXTROMETHORPHAN HYDROBROMIDE 1 TABLET: 600; 30 TABLET, EXTENDED RELEASE ORAL at 20:38

## 2020-01-17 RX ADMIN — BUDESONIDE 0.5 MG: 0.5 SUSPENSION RESPIRATORY (INHALATION) at 08:42

## 2020-01-17 RX ADMIN — HYDROCODONE BITARTRATE AND ACETAMINOPHEN 1 TABLET: 10; 325 TABLET ORAL at 05:21

## 2020-01-17 RX ADMIN — CEFEPIME HYDROCHLORIDE 2 G: 2 INJECTION, POWDER, FOR SOLUTION INTRAVENOUS at 20:38

## 2020-01-17 RX ADMIN — ARFORMOTEROL TARTRATE 15 MCG: 15 SOLUTION RESPIRATORY (INHALATION) at 08:42

## 2020-01-17 RX ADMIN — LATANOPROST 1 DROP: 50 SOLUTION OPHTHALMIC at 08:26

## 2020-01-17 RX ADMIN — QUETIAPINE FUMARATE 100 MG: 50 TABLET, EXTENDED RELEASE ORAL at 20:38

## 2020-01-17 RX ADMIN — SODIUM CHLORIDE, PRESERVATIVE FREE 10 ML: 5 INJECTION INTRAVENOUS at 20:38

## 2020-01-17 RX ADMIN — GUAIFENESIN AND DEXTROMETHORPHAN HYDROBROMIDE 1 TABLET: 600; 30 TABLET, EXTENDED RELEASE ORAL at 08:25

## 2020-01-17 RX ADMIN — ENOXAPARIN SODIUM 40 MG: 40 INJECTION SUBCUTANEOUS at 08:25

## 2020-01-17 RX ADMIN — ARFORMOTEROL TARTRATE 15 MCG: 15 SOLUTION RESPIRATORY (INHALATION) at 20:10

## 2020-01-17 RX ADMIN — HYDROCODONE BITARTRATE AND ACETAMINOPHEN 1 TABLET: 10; 325 TABLET ORAL at 18:07

## 2020-01-17 RX ADMIN — LEVETIRACETAM 750 MG: 500 TABLET, FILM COATED ORAL at 20:38

## 2020-01-17 RX ADMIN — CEFEPIME HYDROCHLORIDE 2 G: 2 INJECTION, POWDER, FOR SOLUTION INTRAVENOUS at 12:15

## 2020-01-17 RX ADMIN — CEFEPIME HYDROCHLORIDE 2 G: 2 INJECTION, POWDER, FOR SOLUTION INTRAVENOUS at 05:00

## 2020-01-17 NOTE — PROGRESS NOTES
Dr. HERLINDA Clark    [address]    1/17/2020    Patient ID:  Name:  Yevgeniy Vang  MRN:  [id number]  1950  69 y.o.  male            CC/Reason for visit: Acute respiratory failure, sepsis, pyelonephritis     HPI: The patient is  slowly improving.  He complains of generalized pain every day, but he seems to be understanding now that he cannot request narcotics around-the-clock.  He is acknowledging this.  We started his Seroquel a few days ago and he feels a little bit better.  He denies suicidal ideation or intention to hurt himself or anybody else.     ROS: He denies chest pain.  No fever or chills.  He has a cough    Vitals:  Vitals:    01/17/20 0719 01/17/20 0842 01/17/20 0851 01/17/20 1444   BP: 118/63   103/65   BP Location: Right arm   Left arm   Patient Position: Lying   Lying   Pulse: 78 95 91 79   Resp: 16 20 20 18   Temp: 98 °F (36.7 °C)   98.2 °F (36.8 °C)   TempSrc: Oral   Oral   SpO2: 93% 93% 100%    Weight:       Height:               Body mass index is 23.98 kg/m².    Intake/Output Summary (Last 24 hours) at 1/17/2020 1523  Last data filed at 1/17/2020 0908  Gross per 24 hour   Intake 1360 ml   Output --   Net 1360 ml       Exam:  GEN:  No distress  Alert, oriented x 3.   LUNGS: Clear breath sounds bilat, no use of accessory muscles  CV:  Normal S1S2, without murmur, no edema  ABD:  Non tender, no enlarged liver or masses      Scheduled meds:    arformoterol 15 mcg Nebulization BID - RT   budesonide 0.5 mg Nebulization Daily - RT   cefepime 2 g Intravenous Q8H   enoxaparin 40 mg Subcutaneous Q24H   guaifenesin-dextromethorphan 1 tablet Oral BID   insulin lispro 0-7 Units Subcutaneous 4x Daily With Meals & Nightly   latanoprost 1 drop Both Eyes Daily   levETIRAcetam 750 mg Oral BID   QUEtiapine  mg Oral Nightly     IV meds:                           Data Review:   I reviewed the patient's medications and new clinical results.  Lab Results   Component Value Date    CALCIUM  8.8 01/17/2020    PHOS 3.3 01/15/2020    MG 2.3 01/14/2020    MG 2.4 01/12/2020     Results from last 7 days   Lab Units 01/17/20  0527 01/16/20  0449 01/15/20  0323 01/14/20  0527  01/12/20  1942   SODIUM mmol/L 136 141 134* 139  --  140   POTASSIUM mmol/L 3.7 3.7 3.3* 3.4*  --  3.9   CHLORIDE mmol/L 101 104 100 104  --  99   CO2 mmol/L 22.0 24.8 25.2 22.4  --  24.5   BUN mg/dL 10 9 14 25*  --  38*   CREATININE mg/dL 1.12 1.28* 1.62* 1.65*   < > 2.98*   CALCIUM mg/dL 8.8 8.8 8.6 8.0*  --  9.5   BILIRUBIN mg/dL  --   --   --   --   --  0.5   ALK PHOS U/L  --   --   --   --   --  78   ALT (SGPT) U/L  --   --   --   --   --  30   AST (SGOT) U/L  --   --   --   --   --  44*   GLUCOSE mg/dL 92 112* 109* 98  --  128*   WBC 10*3/mm3  --  6.30  --  7.01  --  10.55   HEMOGLOBIN g/dL  --  8.1*  --  8.0*  --  10.1*   PLATELETS 10*3/mm3  --  149  --  126*  --  113*   PROBNP pg/mL  --   --   --  613.5  --  2,104.0*   PROCALCITONIN ng/mL  --  5.56*  --  21.68*  --   --     < > = values in this interval not displayed.     Results from last 7 days   Lab Units 01/12/20 2035 01/12/20 1943 01/12/20 1942   BLOODCX   --  No growth at 4 days No growth at 4 days   URINECX  >100,000 CFU/mL Pseudomonas aeruginosa MDRO*  --   --          ASSESSMENT:   1. Sepsis due to pyelonephritis caused by Pseudomonas, multidrug resistant  2. Sepsis causing acute respiratory failure  3. Sepsis causing encephalopathy  4. Sepsis causing acute kidney injury acute renal failure  5. Mild anemia with thrombocytopenia  6. Bilateral hydronephrosis with nephrolithiasis and suspected pyelonephritis  7. Aspiration pneumonia  8. Congestive heart failure  9. COPD, not exacerbated  10. Diabetes mellitus  11. Clinical depression  12. History of convulsive disorder    PLAN:  Contnue ATBx per ID  Urology following. Plans for lithotripsy unclear.  Patient came from [place] facility and has a bed on hold.  Hope to discharge him there tomorrow if okay with urology and  infectious diseases.      Jatinder Clark MD  1/17/2020

## 2020-01-17 NOTE — PLAN OF CARE
Problem: Patient Care Overview  Goal: Plan of Care Review  Outcome: Ongoing (interventions implemented as appropriate)  Flowsheets (Taken 1/17/2020 1521)  Progress: improving  Plan of Care Reviewed With: patient  Outcome Summary: Patient has been pleasant and cooperative during shift. Patient complains of pain and has been treated once today. No nausea or SOA. Patient turned Q2. VSS. Will continue to monitor and assist patient as needed.  Goal: Individualization and Mutuality  Outcome: Ongoing (interventions implemented as appropriate)  Goal: Discharge Needs Assessment  Outcome: Ongoing (interventions implemented as appropriate)  Goal: Interprofessional Rounds/Family Conf  Outcome: Ongoing (interventions implemented as appropriate)     Problem: Fall Risk (Adult)  Goal: Absence of Fall  Outcome: Ongoing (interventions implemented as appropriate)     Problem: Pain, Chronic (Adult)  Goal: Acceptable Pain/Comfort Level and Functional Ability  Outcome: Ongoing (interventions implemented as appropriate)     Problem: Skin Injury Risk (Adult)  Goal: Skin Health and Integrity  Outcome: Ongoing (interventions implemented as appropriate)     Problem: Suicide Risk (Adult)  Goal: Strength-Based Wellness/Recovery  Outcome: Ongoing (interventions implemented as appropriate)  Goal: Physical Safety  Outcome: Ongoing (interventions implemented as appropriate)

## 2020-01-17 NOTE — PLAN OF CARE
Problem: Patient Care Overview  Goal: Plan of Care Review  Outcome: Ongoing (interventions implemented as appropriate)  Flowsheets (Taken 1/17/2020 0329)  Progress: improving  Plan of Care Reviewed With: patient  Outcome Summary: Patient is alert and oriented. VSS on 2 L NC. SB-SR overnight with a lot of PVCs/trigeminy. Turned q2h. IV abx continued. Complains of pain that is unrelieved by his ordered norco, but seems comfortable and slept soundly throughout the night except when staff providing care. No acute distress noted. Will continue to monitor.     Problem: Fall Risk (Adult)  Goal: Absence of Fall  Outcome: Ongoing (interventions implemented as appropriate)  Flowsheets (Taken 1/17/2020 0329)  Absence of Fall: making progress toward outcome     Problem: Pain, Chronic (Adult)  Goal: Acceptable Pain/Comfort Level and Functional Ability  Outcome: Ongoing (interventions implemented as appropriate)  Flowsheets (Taken 1/17/2020 0329)  Acceptable Pain/Comfort Level and Functional Ability: making progress toward outcome     Problem: Skin Injury Risk (Adult)  Goal: Skin Health and Integrity  Outcome: Ongoing (interventions implemented as appropriate)  Flowsheets (Taken 1/17/2020 0329)  Skin Health and Integrity: making progress toward outcome

## 2020-01-17 NOTE — PROGRESS NOTES
LOS: 5 days     Chief Complaint: Pyelonephritis    Interval History:  Afebrile, continues to report pain but this time complains of more back pain than suprapubic tenderness.  Also now the patient states the pain has been present for years.  Pretty much she states everything hurts.  I am unable to get a clear review of systems from him.  He denies a cough.  He is tolerating antibiotics without a rash    Vital Signs  Temp:  [98 °F (36.7 °C)-98.7 °F (37.1 °C)] 98 °F (36.7 °C)  Heart Rate:  [76-95] 91  Resp:  [16-20] 20  BP: (111-120)/(63-81) 118/63    Physical Exam:  General: In no acute distress  Cardiovascular: RRR, no LE edema   Respiratory: Breathing comfortably on room air  GI: Soft, nondistended, reports pain pretty much anywhere I touch him + bowel sounds bilaterally  Skin: No rashes     Antibiotics:  Cefepime 2 g IV every 12 hours     Results Review:    Lab Results   Component Value Date    WBC 6.30 01/16/2020    HGB 8.1 (L) 01/16/2020    HCT 24.4 (L) 01/16/2020    MCV 91.7 01/16/2020     01/16/2020     Lab Results   Component Value Date    GLUCOSE 92 01/17/2020    BUN 10 01/17/2020    CREATININE 1.12 01/17/2020    EGFRIFNONA 21 (L) 01/12/2020    EGFRIFAFRI 79 01/17/2020    BCR 8.9 01/17/2020    CO2 22.0 01/17/2020    CALCIUM 8.8 01/17/2020    ALBUMIN 3.60 01/12/2020    LABIL2 1.0 (L) 06/24/2019    AST 44 (H) 01/12/2020    ALT 30 01/12/2020     Procalcitonin 21.68 -> 5.56    Microbiology:  1/13 MRSA nasal PCR  1/12 RVP negative  1/12 UCx >100K MDRO pseudomonas   1/12 BCx NGTD x 2    Assessment/Plan   1.  Pseudomonas pyelonephritis in the setting of 2 below  2.  Bilateral nephrolithiasis left greater than right with bilateral mild hydronephrosis status post bilateral ureteral stent placement  3.  Elevated procalcitonin  4  Acute on chronic kidney disease.   5.  Dementia    Continue cefepime 2 g IV every 12 hours x10 days with an antibiotic stop date of January 24.  Blood cultures are negative to date  therefore I am okay with placing a PICC line.    Given the presence of bilateral ureteral stents as well as nephrolithiasis the patient will have issues with ongoing recurrent infection leading to further antibiotic resistance.  He is scheduled to follow-up with urology for further treatment.  As long as the ureteral stents remain in place I would recommend fosfomycin suppressive therapy and the patient should be started on fosfomycin 3 g p.o. every 48 hours x4 weeks once he has completed his course of IV cefepime.     ID will sign off. Please do not hesitate to call us with further questions or concerns

## 2020-01-17 NOTE — PROGRESS NOTES
"Access Ctr Note.    Chart reviewed.     All information per Pt report, unless otherwise noted.     Found Pt in bed, covers over head. He easily roused to name being called. He moaned and moved around in bed stating high level of pain. He said his pain level was an \"8\" of 10 and that \"it stays at an 8.\" Pt stated he just wants it to end. Pt told MD less than 2 hours ago he wasn't suicidal and told this writer he's always suicidal. When asked about change form conversation w/MD, he said he just began feeling that way again.  It is notable that Pt is slated for discharge back to his originating NH tomorrow. Pt stated, \"I believe that place is what got me sick\" and continued to comment about issues of cleanliness. It seems that Pt is malingering for extended stay at [facility] or to go to another NH.     As this writer prepared to exit room, Pt commented that his head was now hurting badly. Pt asked this writer to inform RN as he would like pain medication. Pt has regularly been requesting pain medication.     Spoke w/RNShorty, who concurred about likelihood of malingering. Will continue to follow Pt if still admitted tomorrow.   "

## 2020-01-17 NOTE — PROGRESS NOTES
Continued Stay Note  [facility]     Patient Name: Yevgeniy Vang  MRN: [id number]  Today's Date: 1/17/2020    Admit Date: 1/12/2020    Discharge Plan     Row Name 01/17/20 1547       Plan    Plan  Plan return to [facility].   MAYITO Mccoy RN    Patient/Family in Agreement with Plan  yes    Plan Comments  Called and spoke with Sai  ( [phone]) and [facility] has a bed and can accept pt this weekend.   Transportation would need to be arranged.  Plan return [facility].   MAYITO Mccoy RN        Discharge Codes    No documentation.             Suzette Mccoy, RN

## 2020-01-18 LAB
GLUCOSE BLDC GLUCOMTR-MCNC: 111 MG/DL (ref 70–130)
GLUCOSE BLDC GLUCOMTR-MCNC: 115 MG/DL (ref 70–130)
GLUCOSE BLDC GLUCOMTR-MCNC: 115 MG/DL (ref 70–130)
GLUCOSE BLDC GLUCOMTR-MCNC: 99 MG/DL (ref 70–130)

## 2020-01-18 PROCEDURE — 82962 GLUCOSE BLOOD TEST: CPT

## 2020-01-18 PROCEDURE — 25010000002 CEFEPIME PER 500 MG: Performed by: INTERNAL MEDICINE

## 2020-01-18 PROCEDURE — 25010000002 ENOXAPARIN PER 10 MG: Performed by: INTERNAL MEDICINE

## 2020-01-18 PROCEDURE — 94799 UNLISTED PULMONARY SVC/PX: CPT

## 2020-01-18 RX ORDER — GABAPENTIN 300 MG/1
600 CAPSULE ORAL EVERY 12 HOURS SCHEDULED
Status: DISCONTINUED | OUTPATIENT
Start: 2020-01-18 | End: 2020-01-19 | Stop reason: HOSPADM

## 2020-01-18 RX ORDER — TAMSULOSIN HYDROCHLORIDE 0.4 MG/1
0.4 CAPSULE ORAL DAILY
Status: DISCONTINUED | OUTPATIENT
Start: 2020-01-18 | End: 2020-01-19 | Stop reason: HOSPADM

## 2020-01-18 RX ORDER — IPRATROPIUM BROMIDE AND ALBUTEROL SULFATE 2.5; .5 MG/3ML; MG/3ML
3 SOLUTION RESPIRATORY (INHALATION) EVERY 4 HOURS PRN
Qty: 360 ML | Refills: 0 | Status: SHIPPED | OUTPATIENT
Start: 2020-01-18

## 2020-01-18 RX ORDER — BENZONATATE 200 MG/1
200 CAPSULE ORAL EVERY 8 HOURS PRN
Qty: 90 CAPSULE | Refills: 0 | Status: SHIPPED | OUTPATIENT
Start: 2020-01-18

## 2020-01-18 RX ORDER — BUDESONIDE AND FORMOTEROL FUMARATE DIHYDRATE 160; 4.5 UG/1; UG/1
2 AEROSOL RESPIRATORY (INHALATION) 2 TIMES DAILY
Status: DISCONTINUED | OUTPATIENT
Start: 2020-01-18 | End: 2020-01-18

## 2020-01-18 RX ORDER — QUETIAPINE FUMARATE 100 MG/1
100 TABLET, FILM COATED ORAL NIGHTLY
Qty: 30 TABLET | Refills: 0 | Status: SHIPPED | OUTPATIENT
Start: 2020-01-18

## 2020-01-18 RX ORDER — TRAZODONE HYDROCHLORIDE 100 MG/1
100 TABLET ORAL NIGHTLY
Status: DISCONTINUED | OUTPATIENT
Start: 2020-01-18 | End: 2020-01-19 | Stop reason: HOSPADM

## 2020-01-18 RX ORDER — GUAIFENESIN AND DEXTROMETHORPHAN HYDROBROMIDE 600; 30 MG/1; MG/1
1 TABLET, EXTENDED RELEASE ORAL 2 TIMES DAILY
Qty: 60 TABLET | Refills: 0 | Status: SHIPPED | OUTPATIENT
Start: 2020-01-18

## 2020-01-18 RX ORDER — ATORVASTATIN CALCIUM 80 MG/1
80 TABLET, FILM COATED ORAL DAILY
Status: DISCONTINUED | OUTPATIENT
Start: 2020-01-18 | End: 2020-01-19 | Stop reason: HOSPADM

## 2020-01-18 RX ORDER — ACETAMINOPHEN 325 MG/1
650 TABLET ORAL EVERY 4 HOURS PRN
Status: DISCONTINUED | OUTPATIENT
Start: 2020-01-18 | End: 2020-01-19 | Stop reason: HOSPADM

## 2020-01-18 RX ORDER — FERROUS SULFATE 325(65) MG
325 TABLET ORAL DAILY
Status: DISCONTINUED | OUTPATIENT
Start: 2020-01-18 | End: 2020-01-19 | Stop reason: HOSPADM

## 2020-01-18 RX ORDER — DULOXETIN HYDROCHLORIDE 60 MG/1
60 CAPSULE, DELAYED RELEASE ORAL 2 TIMES DAILY
Status: DISCONTINUED | OUTPATIENT
Start: 2020-01-18 | End: 2020-01-19 | Stop reason: HOSPADM

## 2020-01-18 RX ORDER — TRAZODONE HYDROCHLORIDE 100 MG/1
100 TABLET ORAL DAILY
Status: DISCONTINUED | OUTPATIENT
Start: 2020-01-18 | End: 2020-01-18

## 2020-01-18 RX ADMIN — ARFORMOTEROL TARTRATE 15 MCG: 15 SOLUTION RESPIRATORY (INHALATION) at 06:47

## 2020-01-18 RX ADMIN — CEFEPIME HYDROCHLORIDE 2 G: 2 INJECTION, POWDER, FOR SOLUTION INTRAVENOUS at 11:42

## 2020-01-18 RX ADMIN — LEVETIRACETAM 750 MG: 500 TABLET, FILM COATED ORAL at 09:29

## 2020-01-18 RX ADMIN — LEVETIRACETAM 750 MG: 500 TABLET, FILM COATED ORAL at 20:19

## 2020-01-18 RX ADMIN — GABAPENTIN 600 MG: 300 CAPSULE ORAL at 14:43

## 2020-01-18 RX ADMIN — DULOXETINE HYDROCHLORIDE 60 MG: 60 CAPSULE, DELAYED RELEASE ORAL at 20:19

## 2020-01-18 RX ADMIN — DULOXETINE HYDROCHLORIDE 60 MG: 60 CAPSULE, DELAYED RELEASE ORAL at 14:43

## 2020-01-18 RX ADMIN — CEFEPIME HYDROCHLORIDE 2 G: 2 INJECTION, POWDER, FOR SOLUTION INTRAVENOUS at 04:47

## 2020-01-18 RX ADMIN — GUAIFENESIN AND DEXTROMETHORPHAN HYDROBROMIDE 1 TABLET: 600; 30 TABLET, EXTENDED RELEASE ORAL at 09:29

## 2020-01-18 RX ADMIN — SODIUM CHLORIDE, PRESERVATIVE FREE 10 ML: 5 INJECTION INTRAVENOUS at 09:30

## 2020-01-18 RX ADMIN — HYDROCODONE BITARTRATE AND ACETAMINOPHEN 1 TABLET: 10; 325 TABLET ORAL at 00:57

## 2020-01-18 RX ADMIN — ENOXAPARIN SODIUM 40 MG: 40 INJECTION SUBCUTANEOUS at 09:30

## 2020-01-18 RX ADMIN — TAMSULOSIN HYDROCHLORIDE 0.4 MG: 0.4 CAPSULE ORAL at 14:43

## 2020-01-18 RX ADMIN — BUDESONIDE 0.5 MG: 0.5 SUSPENSION RESPIRATORY (INHALATION) at 06:47

## 2020-01-18 RX ADMIN — LATANOPROST 1 DROP: 50 SOLUTION OPHTHALMIC at 09:29

## 2020-01-18 RX ADMIN — ACETAMINOPHEN 650 MG: 325 TABLET, FILM COATED ORAL at 17:28

## 2020-01-18 RX ADMIN — GUAIFENESIN AND DEXTROMETHORPHAN HYDROBROMIDE 1 TABLET: 600; 30 TABLET, EXTENDED RELEASE ORAL at 20:19

## 2020-01-18 RX ADMIN — CEFEPIME HYDROCHLORIDE 2 G: 2 INJECTION, POWDER, FOR SOLUTION INTRAVENOUS at 20:20

## 2020-01-18 RX ADMIN — TRAZODONE HYDROCHLORIDE 100 MG: 100 TABLET ORAL at 20:19

## 2020-01-18 RX ADMIN — ATORVASTATIN CALCIUM 80 MG: 80 TABLET, FILM COATED ORAL at 14:43

## 2020-01-18 RX ADMIN — HYDROCODONE BITARTRATE AND ACETAMINOPHEN 1 TABLET: 10; 325 TABLET ORAL at 11:42

## 2020-01-18 RX ADMIN — FERROUS SULFATE TAB 325 MG (65 MG ELEMENTAL FE) 325 MG: 325 (65 FE) TAB at 14:44

## 2020-01-18 NOTE — DISCHARGE SUMMARY
Date of Discharge:  1/18/2020    Discharge Diagnoses:  1. Acute pyelonephritis secondary to Pseudomonas  2. Bilateral nephrolithiasis with bilateral mild hydro-hydronephrosis status post bilateral ureteral stent placement  3. Sepsis  4. Acute respiratory failure  5. Acute kidney injury on chronic kidney disease  6. Dementia history  7. Peripheral neuropathy      Hospital Course  Patient is a 69 y.o. male presented with sepsis and had acute pyelonephritis with cultures growing Pseudomonas blood cultures were negative.  He had on scans bilateral hydronephrosis mild and bilateral nephrolithiasis and had bilateral ureteral stents placed by Dr Gonzalo Mai.  He was in some respiratory failure and had some acute kidney injury on some chronic kidney disease his respiratory failure and his acute kidney injury have resolved he is back to baseline his records indicate some history of dementia patient had mentioned some suicidality apparently to the nurses although to me he said they the nurses just do not understand him he told him that sometimes he just thinks he would be better off if he was lying in the ground no longer in any pain or discomfort but in no way is he going to harm himself.  Infectious disease recommends continuing cefepime through 1/24/2020 and then they recommend fosfomycin 3 g every 48 hours for 4 weeks once they have completed the cefepime and hopefully the stents and nephrolithiasis can be addressed in that time period..      Procedures Performed         Consults:   Consults     Date and Time Order Name Status Description    1/15/2020 1137 Inpatient Infectious Diseases Consult Completed     1/13/2020 1115 Inpatient Urology Consult Completed     1/12/2020 2142 Pulmonology (on-call MD unless specified) Completed           Pertinent Test Results:   Labs:  Results from last 7 days   Lab Units 01/17/20  0527 01/16/20  0449 01/15/20  0323 01/14/20  0527  01/12/20  1942   GLUCOSE mg/dL 92 112* 109* 98   --  128*   SODIUM mmol/L 136 141 134* 139  --  140   POTASSIUM mmol/L 3.7 3.7 3.3* 3.4*  --  3.9   MAGNESIUM mg/dL  --   --   --  2.3  --  2.4   CO2 mmol/L 22.0 24.8 25.2 22.4  --  24.5   CHLORIDE mmol/L 101 104 100 104  --  99   ANION GAP mmol/L 13.0 12.2 8.8 12.6  --  16.5*   CREATININE mg/dL 1.12 1.28* 1.62* 1.65*   < > 2.98*   BUN mg/dL 10 9 14 25*  --  38*   BUN / CREAT RATIO  8.9 7.0 8.6 15.2  --  12.8   CALCIUM mg/dL 8.8 8.8 8.6 8.0*  --  9.5   EGFR IF NONAFRICN AM mL/min/1.73  --   --   --   --   --  21*   ALK PHOS U/L  --   --   --   --   --  78   TOTAL PROTEIN g/dL  --   --   --   --   --  8.6*   ALT (SGPT) U/L  --   --   --   --   --  30   AST (SGOT) U/L  --   --   --   --   --  44*   BILIRUBIN mg/dL  --   --   --   --   --  0.5   ALBUMIN g/dL  --   --   --   --   --  3.60   GLOBULIN gm/dL  --   --   --   --   --  5.0    < > = values in this interval not displayed.     Estimated Creatinine Clearance: 75.5 mL/min (by C-G formula based on SCr of 1.12 mg/dL).      Results from last 7 days   Lab Units 01/16/20  0449 01/14/20  0527 01/12/20  1942   WBC 10*3/mm3 6.30 7.01 10.55   RBC 10*6/mm3 2.66* 2.64* 3.32*   HEMOGLOBIN g/dL 8.1* 8.0* 10.1*   HEMATOCRIT % 24.4* 23.9* 30.7*   MCV fL 91.7 90.5 92.5   MCH pg 30.5 30.3 30.4   MCHC g/dL 33.2 33.5 32.9   RDW % 12.9 13.1 13.1   RDW-SD fl 42.8 43.2 44.8   MPV fL 9.8 10.0 10.0   PLATELETS 10*3/mm3 149 126* 113*   NEUTROPHIL % % 48.7 53.8  --    LYMPHOCYTE % % 30.5 28.2  --    MONOCYTES % % 16.2* 13.1*  --    EOSINOPHIL % % 3.8 4.0  --    BASOPHIL % % 0.5 0.3  --    IMM GRAN % % 0.3 0.6*  --    NEUTROS ABS 10*3/mm3 3.07 3.77 5.54   LYMPHS ABS 10*3/mm3 1.92 1.98  --    MONOS ABS 10*3/mm3 1.02* 0.92*  --    EOS ABS 10*3/mm3 0.24 0.28  --    BASOS ABS 10*3/mm3 0.03 0.02  --    IMMATURE GRANS (ABS) 10*3/mm3 0.02 0.04  --    NRBC /100 WBC 0.0 0.1  --      Results from last 7 days   Lab Units 01/12/20 2004   PH, ARTERIAL pH units 7.341*   PO2 ART mm Hg 75.8*   PCO2,  ARTERIAL mm Hg 47.8*   HCO3 ART mmol/L 25.9     Results from last 7 days   Lab Units 01/17/20  1717 01/16/20  0658 01/16/20 0449   TROPONIN T ng/mL <0.010 <0.010 <0.010     Results from last 7 days   Lab Units 01/14/20  0527 01/12/20 1942   PROBNP pg/mL 613.5 2,104.0*     Results from last 7 days   Lab Units 01/12/20  1942   TSH uIU/mL 0.413     Results from last 7 days   Lab Units 01/16/20  0449 01/14/20  0527 01/13/20  0752 01/12/20 1942   LACTATE mmol/L  --   --  1.2 1.2   PROCALCITONIN ng/mL 5.56* 21.68*  --   --            Imaging Results (Last 72 Hours)     Procedure Component Value Units Date/Time    XR Chest 1 View [[id number]] Collected:  01/17/20 1752     Updated:  01/17/20 1757    Narrative:       PORTABLE CHEST 01/17/2020 AT 5:13 PM     CLINICAL HISTORY: Chest pain     Compared to the previous chest x-ray dated 01/12/2020.     There is minimal patchy ill-defined increased density at both lung bases  that appears somewhat more prominent on the previous chest x-ray.  Worsening bibasilar pneumonia suspected. The upper lung zones remain  clear. There are no pleural effusions. The heart remains normal in size.     This report was finalized on 1/17/2020 5:54 PM by Dr. Narendra Rodriguez M.D.                    Condition on Discharge: Much improved    Vital Signs  Temp:  [97.9 °F (36.6 °C)-98.4 °F (36.9 °C)] 97.9 °F (36.6 °C)  Heart Rate:  [65-82] 65  Resp:  [16-18] 18  BP: (100-131)/(56-79) 131/60    Physical Exam:  General Appearance: Well-developed black male he is resting in bed getting he is sitting up eating lunch does not appear in acute distress  Eyes: Conjunctiva are clear and anicteric  ENT: Mucas membranes are moist no erythema no exudates  Neck: Trachea midline no lymphadenopathy or thyromegaly no jugular venous distention  Lungs: Clear nonlabored symmetric expansion no wheezes rales or rhonchi  Cardiac: Regular rate rhythm no murmur  Abdomen: Soft no palpable hepatosplenomegaly or masses  : No  flank or costovertebral angle tenderness  Musculoskeletal: He did not have a whole lot of muscle mass in the lower extremities  Skin: No jaundice no petechiae skin is warm and dry  Neuro: He is alert oriented he is cooperative he is moving his arms well he does not want to move his legs says they hurt too much  Extremities/P Vascular: No edema palpable radial and dorsalis pedis pulses bilaterally  MSE: Is a little unusual personality      Discharge Disposition  Skilled Nursing Facility (DC - External)    Discharge Medications     Discharge Medications      New Medications      Instructions Start Date   benzonatate 200 MG capsule  Commonly known as:  TESSALON   200 mg, Oral, Every 8 Hours PRN      cefepime 2 G/ ML solution  Commonly known as:  MAXIPIME   2 g, Intravenous, Every 8 Hours      guaifenesin-dextromethorphan  MG tablet sustained-release 12 hour tablet   1 tablet, Oral, 2 Times Daily      QUEtiapine 100 MG tablet  Commonly known as:  SEROQUEL  Replaces:  SEROQUEL  MG 24 hr tablet   100 mg, Oral, Nightly         Changes to Medications      Instructions Start Date   ipratropium-albuterol 0.5-2.5 mg/3 ml nebulizer  Commonly known as:  DUO-NEB  What changed:    · how to take this  · when to take this  · reasons to take this   3 mL, Nebulization, Every 4 Hours PRN         Continue These Medications      Instructions Start Date   atorvastatin 80 MG tablet  Commonly known as:  LIPITOR   80 mg, Oral, Daily      budesonide-formoterol 160-4.5 MCG/ACT inhaler  Commonly known as:  SYMBICORT   2 puffs, Inhalation, 2 Times Daily      calcium carbonate-cholecalciferol 500-400 MG-UNIT tablet tablet   Oral      DULoxetine 60 MG capsule  Commonly known as:  CYMBALTA   60 mg, Oral, 2 Times Daily      ferrous sulfate 325 (65 FE) MG tablet   325 mg, Oral, Daily      gabapentin 600 MG tablet  Commonly known as:  NEURONTIN   600 mg, Oral, 2 Times Daily      guaiFENesin 100 MG/5ML syrup  Commonly known as:   ROBITUSSIN   200 mg, Oral, 4 Times Daily PRN      HYDROcodone-acetaminophen  MG per tablet  Commonly known as:  NORCO   1 tablet, Oral, Every 6 Hours PRN      latanoprost 0.005 % ophthalmic solution  Commonly known as:  XALATAN   No dose, route, or frequency recorded.      levETIRAcetam 750 MG tablet  Commonly known as:  KEPPRA   750 mg, Oral, 2 Times Daily      melatonin 5 MG tablet tablet   3 mg, Oral, Daily      nitroglycerin 0.4 MG SL tablet  Commonly known as:  NITROSTAT   0.4 mg, Sublingual      tamsulosin 0.4 MG capsule 24 hr capsule  Commonly known as:  FLOMAX   0.4 mg, Oral, Daily      traZODone 150 MG tablet  Commonly known as:  DESYREL   100 mg, Oral, Daily      vitamin D3 125 MCG (5000 UT) tablet tablet   1 tablet, Oral, Daily         Stop These Medications    bisacodyl 10 MG suppository  Commonly known as:  DULCOLAX     magnesium hydroxide 400 MG/5ML suspension  Commonly known as:  MILK OF MAGNESIA     metoprolol tartrate 25 MG tablet  Commonly known as:  LOPRESSOR     ondansetron 4 MG tablet  Commonly known as:  ZOFRAN     polyethylene glycol packet  Commonly known as:  MIRALAX     senna 8.6 MG tablet tablet  Commonly known as:  SENOKOT     SEROQUEL  MG 24 hr tablet  Generic drug:  QUEtiapine XR  Replaced by:  QUEtiapine 100 MG tablet     simethicone 125 MG chewable tablet  Commonly known as:  MYLICON     sodium chloride 0.65 % nasal spray     THERA-M MULTIPLE VITAMINS PO     tiZANidine 4 MG tablet  Commonly known as:  ZANAFLEX     vitamin C 500 MG tablet  Commonly known as:  ASCORBIC ACID            Discharge Diet: Regular    Activity at Discharge: Needs to be up working with physical therapy    Follow-up Appointments  No future appointments.      Test Results Pending at Discharge       Addendum: Patient's discharge held I were unable to get an ambulance or a PICC line placed yesterday.         Dustin Roberts MD  01/18/20  12:59 PM    Time: Spent over 38 minutes on patient's discharge  today

## 2020-01-18 NOTE — PROGRESS NOTES
Access Ctr Note.    Reviewed recent chart entries. Pt is being discharged today 1/18/2020 - see summary by Dr. Roberts dated the same.     Access to sign off.

## 2020-01-18 NOTE — PLAN OF CARE
Patient is resting in bed with some complaint of chronic back pain. PRN pain medication administered and is temporarily affective.  He is alert, cooperative and feeling better as far depressed mood and suicidal ideations. Nursing will continue to turn and reposition every two hours and monitor for improvement in his condition.

## 2020-01-18 NOTE — PLAN OF CARE
Problem: Patient Care Overview  Goal: Plan of Care Review  Outcome: Ongoing (interventions implemented as appropriate)  Flowsheets (Taken 1/18/2020 1410)  Outcome Summary: Pt is alert and oriented.  VSS.  Medicated for pain as needed.  Discharge order in place, CCP to arrange transportation, likelly not until monday.  PICC line to be placed.  Will continue to monitor patient.  Goal: Individualization and Mutuality  Outcome: Ongoing (interventions implemented as appropriate)  Goal: Discharge Needs Assessment  Outcome: Ongoing (interventions implemented as appropriate)  Goal: Interprofessional Rounds/Family Conf  Outcome: Ongoing (interventions implemented as appropriate)     Problem: Fall Risk (Adult)  Goal: Absence of Fall  Outcome: Ongoing (interventions implemented as appropriate)     Problem: Pain, Chronic (Adult)  Goal: Acceptable Pain/Comfort Level and Functional Ability  Outcome: Ongoing (interventions implemented as appropriate)     Problem: Skin Injury Risk (Adult)  Goal: Skin Health and Integrity  Outcome: Ongoing (interventions implemented as appropriate)     Problem: Suicide Risk (Adult)  Goal: Strength-Based Wellness/Recovery  Outcome: Ongoing (interventions implemented as appropriate)  Goal: Physical Safety  Outcome: Ongoing (interventions implemented as appropriate)

## 2020-01-18 NOTE — PROGRESS NOTES
"   LOS: 6 days   Patient Care Team:  Miracle Julio MD as PCP - General (Internal Medicine)        Subjective     68 yo male with indwelling stents and delmar stones. Previously managed by Dr Sykes      Subjective:  Symptoms:  Stable.    Diet:  Adequate intake.    Activity level: Impaired due to weakness.    Pain:  He complains of pain that is moderate.  (Pt c/o chronic pain throughout. States this is ongoing and present even at NH. ).         Objective     Vital Signs  Temp:  [97.9 °F (36.6 °C)-98.4 °F (36.9 °C)] 97.9 °F (36.6 °C)  Heart Rate:  [65-82] 65  Resp:  [16-18] 18  BP: (100-131)/(56-79) 131/60    Objective:  General Appearance:  Comfortable.    Vital signs: (most recent): Blood pressure 131/60, pulse 65, temperature 97.9 °F (36.6 °C), temperature source Oral, resp. rate 18, height 182.9 cm (72\"), weight 85.8 kg (189 lb 2.5 oz), SpO2 94 %.  Vital signs are normal.    Output: Producing urine (briefs on. ).    Lungs:  Normal effort.    Abdomen: Abdomen is soft.  Bowel sounds are normal.     Neurological: Patient is alert and oriented to person, place and time.    Pupils:  Pupils are equal, round, and reactive to light.    Skin:  Warm and dry.              Results Review:  New clinical results reviewed.        Assessment/Plan       Sepsis with acute hypoxic respiratory failure (CMS/HCC)      Assessment:  (Remain on IV cefepime for urine culture.   ).       Tuyet Jones, YAMILA  01/18/20  12:28 PM          "

## 2020-01-18 NOTE — PROGRESS NOTES
LOS: 6 days   Patient Care Team:  Miracle Julio MD as PCP - General (Internal Medicine)    Subjective     Patient is sitting up in bed eating lunch says the food is actually pretty good.  He complains of pain in his legs.  Apparently this is been a chronic pain for years he relates it to Guillane Barré years ago.  Not really having flank or back pain.  Nursing had commented about him telling them he wanted to die.  I spoke with him about it he said they just do not understand me he says tell them that sometimes I think it would be better if I was lying in the ground without any more pain but I am not going to kill myself just do not understand the way I talk.    Review of Systems:          Objective     Vital Signs  Vital Sign Min/Max for last 24 hours  Temp  Min: 97.9 °F (36.6 °C)  Max: 98.4 °F (36.9 °C)   BP  Min: 100/56  Max: 131/60   Pulse  Min: 65  Max: 82   Resp  Min: 16  Max: 18   SpO2  Min: 92 %  Max: 95 %   No data recorded   Weight  Min: 85.8 kg (189 lb 2.5 oz)  Max: 85.8 kg (189 lb 2.5 oz)        Ventilator/Non-Invasive Ventilation Settings (From admission, onward)    None                       Body mass index is 25.65 kg/m².  I/O last 3 completed shifts:  In: 1400 [P.O.:1200; IV Piggyback:200]  Out: -   No intake/output data recorded.        Physical Exam:  General Appearance: Well-developed black male he is resting in bed getting he is sitting up eating lunch does not appear in acute distress  Eyes: Conjunctiva are clear and anicteric  ENT: Mucas membranes are moist no erythema no exudates  Neck: Trachea midline no lymphadenopathy or thyromegaly no jugular venous distention  Lungs: Clear nonlabored symmetric expansion no wheezes rales or rhonchi  Cardiac: Regular rate rhythm no murmur  Abdomen: Soft no palpable hepatosplenomegaly or masses  : No flank or costovertebral angle tenderness  Musculoskeletal: He did not have a whole lot of muscle mass in the lower extremities  Skin: No  jaundice no petechiae skin is warm and dry  Neuro: He is alert oriented he is cooperative he is moving his arms well he does not want to move his legs says they hurt too much  Extremities/P Vascular: No edema palpable radial and dorsalis pedis pulses bilaterally  MSE: Is a little unusual personality       Labs:  Results from last 7 days   Lab Units 01/17/20  0527 01/16/20  0449 01/15/20  0323 01/14/20  0527 01/13/20  0752 01/12/20  1942   GLUCOSE mg/dL 92 112* 109* 98  --  128*   SODIUM mmol/L 136 141 134* 139  --  140   POTASSIUM mmol/L 3.7 3.7 3.3* 3.4*  --  3.9   MAGNESIUM mg/dL  --   --   --  2.3  --  2.4   CO2 mmol/L 22.0 24.8 25.2 22.4  --  24.5   CHLORIDE mmol/L 101 104 100 104  --  99   ANION GAP mmol/L 13.0 12.2 8.8 12.6  --  16.5*   CREATININE mg/dL 1.12 1.28* 1.62* 1.65* 2.42* 2.98*   BUN mg/dL 10 9 14 25*  --  38*   BUN / CREAT RATIO  8.9 7.0 8.6 15.2  --  12.8   CALCIUM mg/dL 8.8 8.8 8.6 8.0*  --  9.5   EGFR IF NONAFRICN AM mL/min/1.73  --   --   --   --   --  21*   ALK PHOS U/L  --   --   --   --   --  78   TOTAL PROTEIN g/dL  --   --   --   --   --  8.6*   ALT (SGPT) U/L  --   --   --   --   --  30   AST (SGOT) U/L  --   --   --   --   --  44*   BILIRUBIN mg/dL  --   --   --   --   --  0.5   ALBUMIN g/dL  --   --   --   --   --  3.60   GLOBULIN gm/dL  --   --   --   --   --  5.0     Estimated Creatinine Clearance: 75.5 mL/min (by C-G formula based on SCr of 1.12 mg/dL).      Results from last 7 days   Lab Units 01/16/20  0449 01/14/20  0527 01/12/20  1942   WBC 10*3/mm3 6.30 7.01 10.55   RBC 10*6/mm3 2.66* 2.64* 3.32*   HEMOGLOBIN g/dL 8.1* 8.0* 10.1*   HEMATOCRIT % 24.4* 23.9* 30.7*   MCV fL 91.7 90.5 92.5   MCH pg 30.5 30.3 30.4   MCHC g/dL 33.2 33.5 32.9   RDW % 12.9 13.1 13.1   RDW-SD fl 42.8 43.2 44.8   MPV fL 9.8 10.0 10.0   PLATELETS 10*3/mm3 149 126* 113*   NEUTROPHIL % % 48.7 53.8  --    LYMPHOCYTE % % 30.5 28.2  --    MONOCYTES % % 16.2* 13.1*  --    EOSINOPHIL % % 3.8 4.0  --    BASOPHIL %  % 0.5 0.3  --    IMM GRAN % % 0.3 0.6*  --    NEUTROS ABS 10*3/mm3 3.07 3.77 5.54   LYMPHS ABS 10*3/mm3 1.92 1.98  --    MONOS ABS 10*3/mm3 1.02* 0.92*  --    EOS ABS 10*3/mm3 0.24 0.28  --    BASOS ABS 10*3/mm3 0.03 0.02  --    IMMATURE GRANS (ABS) 10*3/mm3 0.02 0.04  --    NRBC /100 WBC 0.0 0.1  --      Results from last 7 days   Lab Units 01/12/20 2004   PH, ARTERIAL pH units 7.341*   PO2 ART mm Hg 75.8*   PCO2, ARTERIAL mm Hg 47.8*   HCO3 ART mmol/L 25.9     Results from last 7 days   Lab Units 01/17/20  1717 01/16/20  0658 01/16/20 0449   TROPONIN T ng/mL <0.010 <0.010 <0.010     Results from last 7 days   Lab Units 01/14/20  0527 01/12/20  1942   PROBNP pg/mL 613.5 2,104.0*     Results from last 7 days   Lab Units 01/12/20  1942   TSH uIU/mL 0.413     Results from last 7 days   Lab Units 01/16/20  0449 01/14/20  0527 01/13/20  0752 01/12/20  1942   LACTATE mmol/L  --   --  1.2 1.2   PROCALCITONIN ng/mL 5.56* 21.68*  --   --          Microbiology Results (last 10 days)     Procedure Component Value - Date/Time    MRSA Screen, PCR - Swab, Nares [[id number]]  (Normal) Collected:  01/13/20 0237    Lab Status:  Final result Specimen:  Swab from Nares Updated:  01/13/20 0508     MRSA PCR No MRSA Detected    Respiratory Panel, PCR - Swab, Nasopharynx [[id number]]  (Normal) Collected:  01/12/20 2127    Lab Status:  Final result Specimen:  Swab from Nasopharynx Updated:  01/12/20 2227     ADENOVIRUS, PCR Not Detected     Coronavirus 229E Not Detected     Coronavirus HKU1 Not Detected     Coronavirus NL63 Not Detected     Coronavirus OC43 Not Detected     Human Metapneumovirus Not Detected     Human Rhinovirus/Enterovirus Not Detected     Influenza B PCR Not Detected     Parainfluenza Virus 1 Not Detected     Parainfluenza Virus 2 Not Detected     Parainfluenza Virus 3 Not Detected     Parainfluenza Virus 4 Not Detected     Bordetella pertussis pcr Not Detected     Influenza A H1 2009 PCR Not Detected     Chlamydophila  pneumoniae PCR Not Detected     Mycoplasma pneumo by PCR Not Detected     Influenza A PCR Not Detected     Influenza A H3 Not Detected     Influenza A H1 Not Detected     RSV, PCR Not Detected     Bordetella parapertussis PCR Not Detected    Urine Culture - Urine, Urine, Catheter [[id number]]  (Abnormal)  (Susceptibility) Collected:  01/12/20 2035    Lab Status:  Final result Specimen:  Urine, Catheter Updated:  01/16/20 0616     Urine Culture >100,000 CFU/mL Pseudomonas aeruginosa MDRO     Comment: Multi drug resistant Pseudomonas, patient may be an isolation risk.       Susceptibility      Pseudomonas aeruginosa MDRO     CARSON Not Specified     Amikacin Resistant      Cefepime Susceptible      Ceftazidime Susceptible      Ciprofloxacin Resistant      Fosfomycin  No CLSI Guidelines     Gentamicin Resistant      Levofloxacin Resistant      Piperacillin + Tazobactam Resistant [1]       Tobramycin Resistant               [1]   Modified report. Previous result was Resistant (>=128 ug/ml) on 1/15/2020 at 0636 EST.             Susceptibility Comments     Pseudomonas aeruginosa MDRO    For MDR Pseudomonas infections, susceptibility results may not correlate to clinical outcomes. Please consider infectious disease consult.    Pip/ava confirmed with disc diffusion             Blood Culture - Blood, Arm, Left [[id number]] Collected:  01/12/20 1943    Lab Status:  Final result Specimen:  Blood from Arm, Left Updated:  01/17/20 2000     Blood Culture No growth at 5 days    Blood Culture - Blood, Hand, Right [[id number]] Collected:  01/12/20 1942    Lab Status:  Final result Specimen:  Blood from Hand, Right Updated:  01/17/20 2000     Blood Culture No growth at 5 days                arformoterol 15 mcg Nebulization BID - RT   budesonide 0.5 mg Nebulization Daily - RT   cefepime 2 g Intravenous Q8H   enoxaparin 40 mg Subcutaneous Q24H   guaifenesin-dextromethorphan 1 tablet Oral BID   insulin lispro 0-7 Units Subcutaneous 4x Daily  With Meals & Nightly   latanoprost 1 drop Both Eyes Daily   levETIRAcetam 750 mg Oral BID   QUEtiapine  mg Oral Nightly          Diagnostics:  Ct Abdomen Pelvis Without Contrast    Result Date: 1/12/2020  CT SCANS CHEST, ABDOMEN, PELVIS  HISTORY:  hypoxemia; A41.9-Sepsis, unspecified organism; R65.20-Severe sepsis without septic shock; J96.01-Acute respiratory failure with hypoxia; N39.0-Urinary tract infection, site not specified; D69.6-Thrombocytopenia, unspecified  COMPARISON: None.  TECHNIQUE: Radiation dose reduction techniques were utilized, including automated exposure control and exposure modulation based on body size. Axial images were obtained from the thoracic inlet through the symphysis pubis without oral or IV contrast, per request. Lack of contrast limits exam  FINDINGS CHEST CT:  Respiratory motion degrades quality. Some mild, mostly dependent densities in the lower lobes, right greater than left are favored to be related to areas of atelectasis but associated mild pneumonia cannot be entirely excluded. There are calcified residua of granulomatous disease present. Upper lobes are clear. There is a miniscule left pleural effusion. No obvious adenopathy by noncontrast technique. Aorta nonaneurysmal. Normal heart size.  FINDINGS ABDOMEN/PELVIS CT:  There is artifact due to motion and the patient's upper extremities which were not elevated during acquisition. Bilateral ureteral stents are in place with bilateral nephrolithiasis. There is a large stone dependently in the left renal pelvis measuring 11 mm. Cluster of stones present in the right renal pelvis, largest measuring approximately 8 mm. There are numerous small urinary bladder calculi. There is some mild hydronephrosis bilaterally as well as perinephric inflammation and edema bilaterally, slightly more pronounced on the right side.  Tiny gallstones are present dependently in a nondistended gallbladder. The remaining solid organs are grossly  unremarkable considering lack of contrast and the aforementioned artifact. Patient is status post stent graft repair of an infrarenal abdominal aortic aneurysm. The native aneurysm measures approximately 5.2 x 4.5 cm. Moderate stool with large amount of fecal material in the rectum which is distended at 10.2 cm diameter. The GI tract not opacified for assessment but non obstructive in appearance. Normal appendix. No ascites.     IMPRESSION CHEST CT:  1. Mild lower lobe opacities as discussed with miniscule left effusion.  IMPRESSION ABDOMEN & PELVIS CT:  1. Bilateral nephrolithiasis and other genitourinary related findings as discussed. 2. Uncomplicated cholelithiasis. 3. Constipation with rectal distention but no obstruction. 4. Infrarenal abdominal aortic aneurysm which is status post stent graft repair     This report was finalized on 1/12/2020 11:06 PM by Marvin Galvan M.D.      Ct Chest Without Contrast    Result Date: 1/12/2020  CT SCANS CHEST, ABDOMEN, PELVIS  HISTORY:  hypoxemia; A41.9-Sepsis, unspecified organism; R65.20-Severe sepsis without septic shock; J96.01-Acute respiratory failure with hypoxia; N39.0-Urinary tract infection, site not specified; D69.6-Thrombocytopenia, unspecified  COMPARISON: None.  TECHNIQUE: Radiation dose reduction techniques were utilized, including automated exposure control and exposure modulation based on body size. Axial images were obtained from the thoracic inlet through the symphysis pubis without oral or IV contrast, per request. Lack of contrast limits exam  FINDINGS CHEST CT:  Respiratory motion degrades quality. Some mild, mostly dependent densities in the lower lobes, right greater than left are favored to be related to areas of atelectasis but associated mild pneumonia cannot be entirely excluded. There are calcified residua of granulomatous disease present. Upper lobes are clear. There is a miniscule left pleural effusion. No obvious adenopathy by noncontrast  technique. Aorta nonaneurysmal. Normal heart size.  FINDINGS ABDOMEN/PELVIS CT:  There is artifact due to motion and the patient's upper extremities which were not elevated during acquisition. Bilateral ureteral stents are in place with bilateral nephrolithiasis. There is a large stone dependently in the left renal pelvis measuring 11 mm. Cluster of stones present in the right renal pelvis, largest measuring approximately 8 mm. There are numerous small urinary bladder calculi. There is some mild hydronephrosis bilaterally as well as perinephric inflammation and edema bilaterally, slightly more pronounced on the right side.  Tiny gallstones are present dependently in a nondistended gallbladder. The remaining solid organs are grossly unremarkable considering lack of contrast and the aforementioned artifact. Patient is status post stent graft repair of an infrarenal abdominal aortic aneurysm. The native aneurysm measures approximately 5.2 x 4.5 cm. Moderate stool with large amount of fecal material in the rectum which is distended at 10.2 cm diameter. The GI tract not opacified for assessment but non obstructive in appearance. Normal appendix. No ascites.     IMPRESSION CHEST CT:  1. Mild lower lobe opacities as discussed with miniscule left effusion.  IMPRESSION ABDOMEN & PELVIS CT:  1. Bilateral nephrolithiasis and other genitourinary related findings as discussed. 2. Uncomplicated cholelithiasis. 3. Constipation with rectal distention but no obstruction. 4. Infrarenal abdominal aortic aneurysm which is status post stent graft repair     This report was finalized on 1/12/2020 11:06 PM by Marvin Galvan M.D.      Xr Chest 1 View    Result Date: 1/17/2020  PORTABLE CHEST 01/17/2020 AT 5:13 PM  CLINICAL HISTORY: Chest pain  Compared to the previous chest x-ray dated 01/12/2020.  There is minimal patchy ill-defined increased density at both lung bases that appears somewhat more prominent on the previous chest x-ray.  Worsening bibasilar pneumonia suspected. The upper lung zones remain clear. There are no pleural effusions. The heart remains normal in size.  This report was finalized on 1/17/2020 5:54 PM by Dr. Narendra Rodriguez M.D.      Xr Chest 1 View    Result Date: 1/12/2020  PORTABLE CHEST X-RAY  CLINICAL HISTORY: hypoxemia  COMPARISON:  None.  FINDINGS:  Single portable view of the chest obtained.  There are various overlying monitor leads/artifacts in place. The lungs are well expanded and clear where they can be visualized. There are calcified residua of granulomatous disease present. Cardiac size is within normal limits.  Vascularity is normal considering technique.  No pleural fluid is demonstrated by portable imaging.          No active disease by portable imaging.  This report was finalized on 1/12/2020 11:48 PM by Marvin Galvan M.D.               Active Hospital Problems    Diagnosis  POA   • Sepsis with acute hypoxic respiratory failure (CMS/[facility]) [A41.9, R65.20, J96.01]  Yes      Resolved Hospital Problems   No resolved problems to display.         Assessment/Plan     1. Acute pyelonephritis trace growing Pseudomonas patient is to continue cefepime 2 g IV every 12 hours with a stop date of 1/24/2020.  ID has recommended fosfomycin 3 g p.o. every 48 hours x4 weeks once he is completed his course of IV cefepime for suppression at least until the nephrolithiasis and stents have been addressed.  2. Bilateral nephrolithiasis left greater than right with bilateral mild hydronephrosis status post bilateral ureteral stent placement  3. Sepsis secondary to #1  4. Acute respiratory failure secondary to #1 and 2  5. Acute kidney injury on chronic kidney disease  6. Dementia by history.  7. Question of suicidality the psychiatric services seen him I have talked with him he denies to me that heactively wants to hurt himself he says sometimes he just thinks that it would be better if he was not in so much pain.  Patient is also on  Cymbalta and trazodone chronically will restart those.  8. Peripheral neuropathy patient is on gabapentin likely is not been restarted I think restarting that may help his pain significantly.    Plan for disposition: To nursing home today once we get a midline or PICC line depending on what they will accept    Dustin Roberts MD  01/18/20  12:16 PM    Time:

## 2020-01-19 VITALS
HEART RATE: 84 BPM | OXYGEN SATURATION: 98 % | SYSTOLIC BLOOD PRESSURE: 111 MMHG | DIASTOLIC BLOOD PRESSURE: 56 MMHG | HEIGHT: 72 IN | TEMPERATURE: 98.6 F | RESPIRATION RATE: 18 BRPM | BODY MASS INDEX: 27.8 KG/M2 | WEIGHT: 205.25 LBS

## 2020-01-19 LAB
ALBUMIN SERPL-MCNC: 2.6 G/DL (ref 3.5–5.2)
ANION GAP SERPL CALCULATED.3IONS-SCNC: 13 MMOL/L (ref 5–15)
BASOPHILS # BLD AUTO: 0.05 10*3/MM3 (ref 0–0.2)
BASOPHILS NFR BLD AUTO: 0.9 % (ref 0–1.5)
BUN BLD-MCNC: 10 MG/DL (ref 8–23)
BUN/CREAT SERPL: 7.5 (ref 7–25)
CALCIUM SPEC-SCNC: 8.7 MG/DL (ref 8.6–10.5)
CHLORIDE SERPL-SCNC: 103 MMOL/L (ref 98–107)
CO2 SERPL-SCNC: 24 MMOL/L (ref 22–29)
CREAT BLD-MCNC: 1.34 MG/DL (ref 0.76–1.27)
D-LACTATE SERPL-SCNC: 1.4 MMOL/L (ref 0.5–2)
DEPRECATED RDW RBC AUTO: 45.5 FL (ref 37–54)
EOSINOPHIL # BLD AUTO: 0.25 10*3/MM3 (ref 0–0.4)
EOSINOPHIL NFR BLD AUTO: 4.6 % (ref 0.3–6.2)
ERYTHROCYTE [DISTWIDTH] IN BLOOD BY AUTOMATED COUNT: 13.5 % (ref 12.3–15.4)
GFR SERPL CREATININE-BSD FRML MDRD: 64 ML/MIN/1.73
GLUCOSE BLD-MCNC: 199 MG/DL (ref 65–99)
GLUCOSE BLDC GLUCOMTR-MCNC: 110 MG/DL (ref 70–130)
GLUCOSE BLDC GLUCOMTR-MCNC: 151 MG/DL (ref 70–130)
HCT VFR BLD AUTO: 25.7 % (ref 37.5–51)
HGB BLD-MCNC: 8.2 G/DL (ref 13–17.7)
IMM GRANULOCYTES # BLD AUTO: 0.08 10*3/MM3 (ref 0–0.05)
IMM GRANULOCYTES NFR BLD AUTO: 1.5 % (ref 0–0.5)
LYMPHOCYTES # BLD AUTO: 2.36 10*3/MM3 (ref 0.7–3.1)
LYMPHOCYTES NFR BLD AUTO: 43.4 % (ref 19.6–45.3)
MCH RBC QN AUTO: 29.7 PG (ref 26.6–33)
MCHC RBC AUTO-ENTMCNC: 31.9 G/DL (ref 31.5–35.7)
MCV RBC AUTO: 93.1 FL (ref 79–97)
MONOCYTES # BLD AUTO: 0.71 10*3/MM3 (ref 0.1–0.9)
MONOCYTES NFR BLD AUTO: 13.1 % (ref 5–12)
NEUTROPHILS # BLD AUTO: 1.99 10*3/MM3 (ref 1.7–7)
NEUTROPHILS NFR BLD AUTO: 36.5 % (ref 42.7–76)
NRBC BLD AUTO-RTO: 0.2 /100 WBC (ref 0–0.2)
PHOSPHATE SERPL-MCNC: 3.2 MG/DL (ref 2.5–4.5)
PLATELET # BLD AUTO: 264 10*3/MM3 (ref 140–450)
PMV BLD AUTO: 9.4 FL (ref 6–12)
POTASSIUM BLD-SCNC: 3.6 MMOL/L (ref 3.5–5.2)
PROCALCITONIN SERPL-MCNC: 1.42 NG/ML (ref 0.1–0.25)
RBC # BLD AUTO: 2.76 10*6/MM3 (ref 4.14–5.8)
SODIUM BLD-SCNC: 140 MMOL/L (ref 136–145)
TROPONIN T SERPL-MCNC: <0.01 NG/ML (ref 0–0.03)
WBC NRBC COR # BLD: 5.44 10*3/MM3 (ref 3.4–10.8)

## 2020-01-19 PROCEDURE — 84484 ASSAY OF TROPONIN QUANT: CPT | Performed by: INTERNAL MEDICINE

## 2020-01-19 PROCEDURE — C1751 CATH, INF, PER/CENT/MIDLINE: HCPCS

## 2020-01-19 PROCEDURE — 82962 GLUCOSE BLOOD TEST: CPT

## 2020-01-19 PROCEDURE — 80069 RENAL FUNCTION PANEL: CPT | Performed by: INTERNAL MEDICINE

## 2020-01-19 PROCEDURE — 05HY33Z INSERTION OF INFUSION DEVICE INTO UPPER VEIN, PERCUTANEOUS APPROACH: ICD-10-PCS | Performed by: INTERNAL MEDICINE

## 2020-01-19 PROCEDURE — 85025 COMPLETE CBC W/AUTO DIFF WBC: CPT | Performed by: INTERNAL MEDICINE

## 2020-01-19 PROCEDURE — 94799 UNLISTED PULMONARY SVC/PX: CPT

## 2020-01-19 PROCEDURE — 83605 ASSAY OF LACTIC ACID: CPT | Performed by: INTERNAL MEDICINE

## 2020-01-19 PROCEDURE — 93005 ELECTROCARDIOGRAM TRACING: CPT | Performed by: INTERNAL MEDICINE

## 2020-01-19 PROCEDURE — 93010 ELECTROCARDIOGRAM REPORT: CPT | Performed by: INTERNAL MEDICINE

## 2020-01-19 PROCEDURE — 25010000002 CEFEPIME PER 500 MG: Performed by: INTERNAL MEDICINE

## 2020-01-19 PROCEDURE — 25010000002 ENOXAPARIN PER 10 MG: Performed by: INTERNAL MEDICINE

## 2020-01-19 PROCEDURE — 84145 PROCALCITONIN (PCT): CPT | Performed by: INTERNAL MEDICINE

## 2020-01-19 RX ORDER — QUETIAPINE FUMARATE 50 MG/1
50 TABLET, FILM COATED ORAL NIGHTLY
Status: DISCONTINUED | OUTPATIENT
Start: 2020-01-19 | End: 2020-01-19

## 2020-01-19 RX ORDER — ACETAMINOPHEN 325 MG/1
650 TABLET ORAL EVERY 4 HOURS PRN
Qty: 100 TABLET | Refills: 0 | Status: SHIPPED | OUTPATIENT
Start: 2020-01-19

## 2020-01-19 RX ORDER — QUETIAPINE FUMARATE 50 MG/1
50 TABLET, FILM COATED ORAL EVERY 12 HOURS SCHEDULED
Status: DISCONTINUED | OUTPATIENT
Start: 2020-01-19 | End: 2020-01-19

## 2020-01-19 RX ADMIN — ATORVASTATIN CALCIUM 80 MG: 80 TABLET, FILM COATED ORAL at 09:51

## 2020-01-19 RX ADMIN — BUDESONIDE 0.5 MG: 0.5 SUSPENSION RESPIRATORY (INHALATION) at 10:43

## 2020-01-19 RX ADMIN — CEFEPIME HYDROCHLORIDE 2 G: 2 INJECTION, POWDER, FOR SOLUTION INTRAVENOUS at 04:19

## 2020-01-19 RX ADMIN — LATANOPROST 1 DROP: 50 SOLUTION OPHTHALMIC at 09:51

## 2020-01-19 RX ADMIN — FERROUS SULFATE TAB 325 MG (65 MG ELEMENTAL FE) 325 MG: 325 (65 FE) TAB at 09:51

## 2020-01-19 RX ADMIN — LEVETIRACETAM 750 MG: 500 TABLET, FILM COATED ORAL at 09:51

## 2020-01-19 RX ADMIN — DULOXETINE HYDROCHLORIDE 60 MG: 60 CAPSULE, DELAYED RELEASE ORAL at 09:51

## 2020-01-19 RX ADMIN — SODIUM CHLORIDE 1000 ML: 9 INJECTION, SOLUTION INTRAVENOUS at 01:10

## 2020-01-19 RX ADMIN — TAMSULOSIN HYDROCHLORIDE 0.4 MG: 0.4 CAPSULE ORAL at 09:51

## 2020-01-19 RX ADMIN — ENOXAPARIN SODIUM 40 MG: 40 INJECTION SUBCUTANEOUS at 09:51

## 2020-01-19 RX ADMIN — CEFEPIME HYDROCHLORIDE 2 G: 2 INJECTION, POWDER, FOR SOLUTION INTRAVENOUS at 12:17

## 2020-01-19 RX ADMIN — HYDROCODONE BITARTRATE AND ACETAMINOPHEN 1 TABLET: 10; 325 TABLET ORAL at 04:18

## 2020-01-19 RX ADMIN — GUAIFENESIN AND DEXTROMETHORPHAN HYDROBROMIDE 1 TABLET: 600; 30 TABLET, EXTENDED RELEASE ORAL at 09:52

## 2020-01-19 RX ADMIN — GABAPENTIN 600 MG: 300 CAPSULE ORAL at 09:51

## 2020-01-19 NOTE — PLAN OF CARE
"Patient is resting well at this time after recently receiving pain pill for c/o \"headache from coughing\". He is currently receiving a bolus of Iv fluids for hypotension and has his oxygen on at 2 ltrs. To keep his saturation >/= 92%. Patient has also been started back on some antidepressives since yesterday, nursing will monitor for signs of improvement in his condition. His lungs have course crackles with a non-productive cough noted. Nursing will continue to turn and reposition every two hours.  "

## 2020-01-19 NOTE — NURSING NOTE
Spoke with IV nurse davidson earlier about pt receiving picc line, she wanted to make sure if pt was to get a midline or picc placed. Called [facility] spoke with nurse Ashby. Symone stated that the pt will need a picc line. Notified Lalitha ([facility]), at about 1645, she states not able to get an ambulance this late. IV nurse davidson that pt still needs to get picc line so that pt can receive iv abx and go to NH tmrw. Will continue to monitor.

## 2020-01-19 NOTE — PLAN OF CARE
Problem: Patient Care Overview  Goal: Plan of Care Review  Outcome: Ongoing (interventions implemented as appropriate)  Flowsheets (Taken 1/19/2020 0086)  Outcome Summary: Pt is alert and oriented.  Heading back to the NH this evening by ambulance.  Vitals are stable.  PICC line in place.  Report called.  Will continue to monitor.  Goal: Individualization and Mutuality  Outcome: Ongoing (interventions implemented as appropriate)  Goal: Discharge Needs Assessment  Outcome: Ongoing (interventions implemented as appropriate)  Goal: Interprofessional Rounds/Family Conf  Outcome: Ongoing (interventions implemented as appropriate)     Problem: Fall Risk (Adult)  Goal: Absence of Fall  Outcome: Ongoing (interventions implemented as appropriate)     Problem: Pain, Chronic (Adult)  Goal: Acceptable Pain/Comfort Level and Functional Ability  Outcome: Ongoing (interventions implemented as appropriate)     Problem: Skin Injury Risk (Adult)  Goal: Skin Health and Integrity  Outcome: Ongoing (interventions implemented as appropriate)     Problem: Suicide Risk (Adult)  Goal: Strength-Based Wellness/Recovery  Outcome: Ongoing (interventions implemented as appropriate)  Goal: Physical Safety  Outcome: Ongoing (interventions implemented as appropriate)

## 2020-01-19 NOTE — PROGRESS NOTES
LOS: 7 days   Patient Care Team:  Miracle Julio MD as PCP - General (Internal Medicine)    Subjective     Discussed with nursing there was some confusion over him leaving last night with regards to ambulances and PICC line that  apparently been straightened out and getting PICC line currently.  Patient has apparently complained of a little abdominal soreness not really pain and has had a normal bowel movement already this morning.  Nursing was told overnight that he dropped his blood pressure and he got a fluid bolus apparently all the vital signs recorded look very good.  Nursing went back through the computer and found 1 blood pressure on the monitor that read the 80s over 40s but follow-up pressure shortly after that was much better.  His pressures been good all day.    Nursing is also noted that the patient has not wanted to leave the hospital he wants to stay here he does not like the nursing home as well and that is been an ongoing issue.    Review of Systems:          Objective     Vital Signs  Vital Sign Min/Max for last 24 hours  Temp  Min: 96.2 °F (35.7 °C)  Max: 98.5 °F (36.9 °C)   BP  Min: 109/75  Max: 113/78   Pulse  Min: 68  Max: 92   Resp  Min: 18  Max: 18   SpO2  Min: 93 %  Max: 99 %   Flow (L/min)  Min: 2  Max: 2   Weight  Min: 93.1 kg (205 lb 4 oz)  Max: 93.1 kg (205 lb 4 oz)        Ventilator/Non-Invasive Ventilation Settings (From admission, onward)    None                       Body mass index is 27.84 kg/m².  No intake/output data recorded.  No intake/output data recorded.        Physical Exam:  General Appearance: Well-developed black male he is resting in bed in no apparent distress  Eyes: Conjunctiva are clear and anicteric  ENT: Mucas membranes are moist no erythema no exudates  Neck: Trachea midline no lymphadenopathy or thyromegaly no jugular venous distention  Lungs: Clear nonlabored symmetric expansion no wheezes rales or rhonchi  Cardiac: Regular rate rhythm no  murmur  Abdomen: Soft no palpable hepatosplenomegaly or masses, when I first pushed on his abdomen in the left upper quadrant he said it was a little sore but distract him and I was pushing a lot more a lot harder and he did not have any reaction  : No flank or costovertebral angle tenderness  Musculoskeletal: He did not have a whole lot of muscle mass in the lower extremities  Skin: No jaundice no petechiae skin is warm and dry  Neuro: He is alert oriented he is cooperative   Extremities/P Vascular: No edema palpable radial and dorsalis pedis pulses bilaterally  MSE: Is a little unusual personality and that not really changed       Labs:  Results from last 7 days   Lab Units 01/19/20  0100 01/17/20  0527 01/16/20  0449 01/15/20  0323 01/14/20  0527 01/13/20  0752 01/12/20  1942   GLUCOSE mg/dL 199* 92 112* 109* 98  --  128*   SODIUM mmol/L 140 136 141 134* 139  --  140   POTASSIUM mmol/L 3.6 3.7 3.7 3.3* 3.4*  --  3.9   MAGNESIUM mg/dL  --   --   --   --  2.3  --  2.4   CO2 mmol/L 24.0 22.0 24.8 25.2 22.4  --  24.5   CHLORIDE mmol/L 103 101 104 100 104  --  99   ANION GAP mmol/L 13.0 13.0 12.2 8.8 12.6  --  16.5*   CREATININE mg/dL 1.34* 1.12 1.28* 1.62* 1.65* 2.42* 2.98*   BUN mg/dL 10 10 9 14 25*  --  38*   BUN / CREAT RATIO  7.5 8.9 7.0 8.6 15.2  --  12.8   CALCIUM mg/dL 8.7 8.8 8.8 8.6 8.0*  --  9.5   EGFR IF NONAFRICN AM mL/min/1.73  --   --   --   --   --   --  21*   ALK PHOS U/L  --   --   --   --   --   --  78   TOTAL PROTEIN g/dL  --   --   --   --   --   --  8.6*   ALT (SGPT) U/L  --   --   --   --   --   --  30   AST (SGOT) U/L  --   --   --   --   --   --  44*   BILIRUBIN mg/dL  --   --   --   --   --   --  0.5   ALBUMIN g/dL 2.60*  --   --   --   --   --  3.60   GLOBULIN gm/dL  --   --   --   --   --   --  5.0     Estimated Creatinine Clearance: 68.5 mL/min (A) (by C-G formula based on SCr of 1.34 mg/dL (H)).      Results from last 7 days   Lab Units 01/19/20  0100 01/16/20  0449 01/14/20  0527  01/12/20  1942   WBC 10*3/mm3 5.44 6.30 7.01 10.55   RBC 10*6/mm3 2.76* 2.66* 2.64* 3.32*   HEMOGLOBIN g/dL 8.2* 8.1* 8.0* 10.1*   HEMATOCRIT % 25.7* 24.4* 23.9* 30.7*   MCV fL 93.1 91.7 90.5 92.5   MCH pg 29.7 30.5 30.3 30.4   MCHC g/dL 31.9 33.2 33.5 32.9   RDW % 13.5 12.9 13.1 13.1   RDW-SD fl 45.5 42.8 43.2 44.8   MPV fL 9.4 9.8 10.0 10.0   PLATELETS 10*3/mm3 264 149 126* 113*   NEUTROPHIL % % 36.5* 48.7 53.8  --    LYMPHOCYTE % % 43.4 30.5 28.2  --    MONOCYTES % % 13.1* 16.2* 13.1*  --    EOSINOPHIL % % 4.6 3.8 4.0  --    BASOPHIL % % 0.9 0.5 0.3  --    IMM GRAN % % 1.5* 0.3 0.6*  --    NEUTROS ABS 10*3/mm3 1.99 3.07 3.77 5.54   LYMPHS ABS 10*3/mm3 2.36 1.92 1.98  --    MONOS ABS 10*3/mm3 0.71 1.02* 0.92*  --    EOS ABS 10*3/mm3 0.25 0.24 0.28  --    BASOS ABS 10*3/mm3 0.05 0.03 0.02  --    IMMATURE GRANS (ABS) 10*3/mm3 0.08* 0.02 0.04  --    NRBC /100 WBC 0.2 0.0 0.1  --      Results from last 7 days   Lab Units 01/12/20 2004   PH, ARTERIAL pH units 7.341*   PO2 ART mm Hg 75.8*   PCO2, ARTERIAL mm Hg 47.8*   HCO3 ART mmol/L 25.9     Results from last 7 days   Lab Units 01/19/20  0100 01/17/20  1717 01/16/20  0658   TROPONIN T ng/mL <0.010 <0.010 <0.010     Results from last 7 days   Lab Units 01/14/20  0527 01/12/20 1942   PROBNP pg/mL 613.5 2,104.0*     Results from last 7 days   Lab Units 01/12/20 1942   TSH uIU/mL 0.413     Results from last 7 days   Lab Units 01/19/20  0100 01/16/20  0449 01/14/20  0527 01/13/20  0752 01/12/20 1942   LACTATE mmol/L 1.4  --   --  1.2 1.2   PROCALCITONIN ng/mL 1.42* 5.56* 21.68*  --   --          Microbiology Results (last 10 days)     Procedure Component Value - Date/Time    MRSA Screen, PCR - Swab, Nares [[id number]]  (Normal) Collected:  01/13/20 0237    Lab Status:  Final result Specimen:  Swab from Nares Updated:  01/13/20 0508     MRSA PCR No MRSA Detected    Respiratory Panel, PCR - Swab, Nasopharynx [[id number]]  (Normal) Collected:  01/12/20 2127    Lab Status:   Final result Specimen:  Swab from Nasopharynx Updated:  01/12/20 2227     ADENOVIRUS, PCR Not Detected     Coronavirus 229E Not Detected     Coronavirus HKU1 Not Detected     Coronavirus NL63 Not Detected     Coronavirus OC43 Not Detected     Human Metapneumovirus Not Detected     Human Rhinovirus/Enterovirus Not Detected     Influenza B PCR Not Detected     Parainfluenza Virus 1 Not Detected     Parainfluenza Virus 2 Not Detected     Parainfluenza Virus 3 Not Detected     Parainfluenza Virus 4 Not Detected     Bordetella pertussis pcr Not Detected     Influenza A H1 2009 PCR Not Detected     Chlamydophila pneumoniae PCR Not Detected     Mycoplasma pneumo by PCR Not Detected     Influenza A PCR Not Detected     Influenza A H3 Not Detected     Influenza A H1 Not Detected     RSV, PCR Not Detected     Bordetella parapertussis PCR Not Detected    Urine Culture - Urine, Urine, Catheter [[id number]]  (Abnormal)  (Susceptibility) Collected:  01/12/20 2035    Lab Status:  Final result Specimen:  Urine, Catheter Updated:  01/16/20 0616     Urine Culture >100,000 CFU/mL Pseudomonas aeruginosa MDRO     Comment: Multi drug resistant Pseudomonas, patient may be an isolation risk.       Susceptibility      Pseudomonas aeruginosa MDRO     CARSON Not Specified     Amikacin Resistant      Cefepime Susceptible      Ceftazidime Susceptible      Ciprofloxacin Resistant      Fosfomycin  No CLSI Guidelines     Gentamicin Resistant      Levofloxacin Resistant      Piperacillin + Tazobactam Resistant [1]       Tobramycin Resistant               [1]   Modified report. Previous result was Resistant (>=128 ug/ml) on 1/15/2020 at 0636 EST.             Susceptibility Comments     Pseudomonas aeruginosa MDRO    For MDR Pseudomonas infections, susceptibility results may not correlate to clinical outcomes. Please consider infectious disease consult.    Pip/ava confirmed with disc diffusion             Blood Culture - Blood, Arm, Left [[id number]]  Collected:  01/12/20 1943    Lab Status:  Final result Specimen:  Blood from Arm, Left Updated:  01/17/20 2000     Blood Culture No growth at 5 days    Blood Culture - Blood, Hand, Right [[id number]] Collected:  01/12/20 1942    Lab Status:  Final result Specimen:  Blood from Hand, Right Updated:  01/17/20 2000     Blood Culture No growth at 5 days                atorvastatin 80 mg Oral Daily   budesonide 0.5 mg Nebulization Daily - RT   cefepime 2 g Intravenous Q8H   DULoxetine 60 mg Oral BID   enoxaparin 40 mg Subcutaneous Q24H   ferrous sulfate 325 mg Oral Daily   gabapentin 600 mg Oral Q12H   guaifenesin-dextromethorphan 1 tablet Oral BID   insulin lispro 0-7 Units Subcutaneous 4x Daily With Meals & Nightly   latanoprost 1 drop Both Eyes Daily   levETIRAcetam 750 mg Oral BID   tamsulosin 0.4 mg Oral Daily   traZODone 100 mg Oral Nightly          Diagnostics:  Ct Abdomen Pelvis Without Contrast    Result Date: 1/12/2020  CT SCANS CHEST, ABDOMEN, PELVIS  HISTORY:  hypoxemia; A41.9-Sepsis, unspecified organism; R65.20-Severe sepsis without septic shock; J96.01-Acute respiratory failure with hypoxia; N39.0-Urinary tract infection, site not specified; D69.6-Thrombocytopenia, unspecified  COMPARISON: None.  TECHNIQUE: Radiation dose reduction techniques were utilized, including automated exposure control and exposure modulation based on body size. Axial images were obtained from the thoracic inlet through the symphysis pubis without oral or IV contrast, per request. Lack of contrast limits exam  FINDINGS CHEST CT:  Respiratory motion degrades quality. Some mild, mostly dependent densities in the lower lobes, right greater than left are favored to be related to areas of atelectasis but associated mild pneumonia cannot be entirely excluded. There are calcified residua of granulomatous disease present. Upper lobes are clear. There is a miniscule left pleural effusion. No obvious adenopathy by noncontrast technique. Aorta  nonaneurysmal. Normal heart size.  FINDINGS ABDOMEN/PELVIS CT:  There is artifact due to motion and the patient's upper extremities which were not elevated during acquisition. Bilateral ureteral stents are in place with bilateral nephrolithiasis. There is a large stone dependently in the left renal pelvis measuring 11 mm. Cluster of stones present in the right renal pelvis, largest measuring approximately 8 mm. There are numerous small urinary bladder calculi. There is some mild hydronephrosis bilaterally as well as perinephric inflammation and edema bilaterally, slightly more pronounced on the right side.  Tiny gallstones are present dependently in a nondistended gallbladder. The remaining solid organs are grossly unremarkable considering lack of contrast and the aforementioned artifact. Patient is status post stent graft repair of an infrarenal abdominal aortic aneurysm. The native aneurysm measures approximately 5.2 x 4.5 cm. Moderate stool with large amount of fecal material in the rectum which is distended at 10.2 cm diameter. The GI tract not opacified for assessment but non obstructive in appearance. Normal appendix. No ascites.     IMPRESSION CHEST CT:  1. Mild lower lobe opacities as discussed with miniscule left effusion.  IMPRESSION ABDOMEN & PELVIS CT:  1. Bilateral nephrolithiasis and other genitourinary related findings as discussed. 2. Uncomplicated cholelithiasis. 3. Constipation with rectal distention but no obstruction. 4. Infrarenal abdominal aortic aneurysm which is status post stent graft repair     This report was finalized on 1/12/2020 11:06 PM by Marvin Galvan M.D.      Ct Chest Without Contrast    Result Date: 1/12/2020  CT SCANS CHEST, ABDOMEN, PELVIS  HISTORY:  hypoxemia; A41.9-Sepsis, unspecified organism; R65.20-Severe sepsis without septic shock; J96.01-Acute respiratory failure with hypoxia; N39.0-Urinary tract infection, site not specified; D69.6-Thrombocytopenia, unspecified   COMPARISON: None.  TECHNIQUE: Radiation dose reduction techniques were utilized, including automated exposure control and exposure modulation based on body size. Axial images were obtained from the thoracic inlet through the symphysis pubis without oral or IV contrast, per request. Lack of contrast limits exam  FINDINGS CHEST CT:  Respiratory motion degrades quality. Some mild, mostly dependent densities in the lower lobes, right greater than left are favored to be related to areas of atelectasis but associated mild pneumonia cannot be entirely excluded. There are calcified residua of granulomatous disease present. Upper lobes are clear. There is a miniscule left pleural effusion. No obvious adenopathy by noncontrast technique. Aorta nonaneurysmal. Normal heart size.  FINDINGS ABDOMEN/PELVIS CT:  There is artifact due to motion and the patient's upper extremities which were not elevated during acquisition. Bilateral ureteral stents are in place with bilateral nephrolithiasis. There is a large stone dependently in the left renal pelvis measuring 11 mm. Cluster of stones present in the right renal pelvis, largest measuring approximately 8 mm. There are numerous small urinary bladder calculi. There is some mild hydronephrosis bilaterally as well as perinephric inflammation and edema bilaterally, slightly more pronounced on the right side.  Tiny gallstones are present dependently in a nondistended gallbladder. The remaining solid organs are grossly unremarkable considering lack of contrast and the aforementioned artifact. Patient is status post stent graft repair of an infrarenal abdominal aortic aneurysm. The native aneurysm measures approximately 5.2 x 4.5 cm. Moderate stool with large amount of fecal material in the rectum which is distended at 10.2 cm diameter. The GI tract not opacified for assessment but non obstructive in appearance. Normal appendix. No ascites.     IMPRESSION CHEST CT:  1. Mild lower lobe  opacities as discussed with miniscule left effusion.  IMPRESSION ABDOMEN & PELVIS CT:  1. Bilateral nephrolithiasis and other genitourinary related findings as discussed. 2. Uncomplicated cholelithiasis. 3. Constipation with rectal distention but no obstruction. 4. Infrarenal abdominal aortic aneurysm which is status post stent graft repair     This report was finalized on 1/12/2020 11:06 PM by Marvin Galvan M.D.      Xr Chest 1 View    Result Date: 1/17/2020  PORTABLE CHEST 01/17/2020 AT 5:13 PM  CLINICAL HISTORY: Chest pain  Compared to the previous chest x-ray dated 01/12/2020.  There is minimal patchy ill-defined increased density at both lung bases that appears somewhat more prominent on the previous chest x-ray. Worsening bibasilar pneumonia suspected. The upper lung zones remain clear. There are no pleural effusions. The heart remains normal in size.  This report was finalized on 1/17/2020 5:54 PM by Dr. Narendra Rodriguez M.D.      Xr Chest 1 View    Result Date: 1/12/2020  PORTABLE CHEST X-RAY  CLINICAL HISTORY: hypoxemia  COMPARISON:  None.  FINDINGS:  Single portable view of the chest obtained.  There are various overlying monitor leads/artifacts in place. The lungs are well expanded and clear where they can be visualized. There are calcified residua of granulomatous disease present. Cardiac size is within normal limits.  Vascularity is normal considering technique.  No pleural fluid is demonstrated by portable imaging.          No active disease by portable imaging.  This report was finalized on 1/12/2020 11:48 PM by Marvin Gavlan M.D.               Active Hospital Problems    Diagnosis  POA   • Sepsis with acute hypoxic respiratory failure (CMS/[facility]) [A41.9, R65.20, J96.01]  Yes      Resolved Hospital Problems   No resolved problems to display.         Assessment/Plan     1. Acute pyelonephritis culture growing Pseudomonas patient is to continue cefepime 2 g IV every 12 hours with a stop date of 1/24/2020.   ID has recommended fosfomycin 3 g p.o. every 48 hours x4 weeks once he is completed his course of IV cefepime for suppression at least until the nephrolithiasis and stents have been addressed.  2. Bilateral nephrolithiasis left greater than right with bilateral mild hydronephrosis status post bilateral ureteral stent placement  3. Sepsis secondary to #1 resolved  4. Acute respiratory failure secondary to #1 and 2  5. Acute kidney injury on chronic kidney disease monitor creatinine  6. Dementia by history.  7. Question of suicidality the psychiatric services seen him I have talked with him he denies to me that heactively wants to hurt himself he says sometimes he just thinks that it would be better if he was not in so much pain.  But he is not thinking about hurting himself.  He says those girls just do not understand me.  Patient is also on Cymbalta and trazodone chronically will restart those.  8. Peripheral neuropathy back on gabapentin  9. Question events last night really not clear his blood pressures been great looks like they had 1 isolated low pressure that follow-up blood pressure was good.  His vital signs have been stable labs are pretty stable.  I really cannot explain what transpired and if anything significant occurred.    Plan for disposition: To nursing home today once we get a midline or PICC line depending on what they will accept    Dustin Roberts MD  01/19/20  11:20 AM    Time:

## 2020-01-19 NOTE — DISCHARGE SUMMARY
Date of Discharge:  1/19/2020    Discharge Diagnoses:  1. Acute pyelonephritis secondary to Pseudomonas  2. Bilateral nephrolithiasis with bilateral mild hydro-hydronephrosis status post bilateral ureteral stent placement  3. Sepsis  4. Acute respiratory failure  5. Acute kidney injury on chronic kidney disease  6. Dementia history  7. Peripheral neuropathy      Hospital Course  Patient is a 69 y.o. male presented with sepsis and had acute pyelonephritis with cultures growing Pseudomonas blood cultures were negative.  He had on scans bilateral hydronephrosis mild and bilateral nephrolithiasis and had bilateral ureteral stents placed by Dr Gonzalo Mai.  He was in some respiratory failure and had some acute kidney injury on some chronic kidney disease his respiratory failure and his acute kidney injury have resolved he is back to baseline his records indicate some history of dementia patient had mentioned some suicidality apparently to the nurses although to me he said they the nurses just do not understand him he told him that sometimes he just thinks he would be better off if he was lying in the ground no longer in any pain or discomfort but in no way is he going to harm himself.  Infectious disease recommends continuing cefepime through 1/24/2020 and then they recommend fosfomycin 3 g every 48 hours for 4 weeks once they have completed the cefepime and hopefully the stents and nephrolithiasis can be addressed in that time period.. I do recommend a repeat basic metabolic profile to monitor his renal function in a few days    Procedures Performed         Consults:   Consults     Date and Time Order Name Status Description    1/15/2020 1137 Inpatient Infectious Diseases Consult Completed     1/13/2020 1115 Inpatient Urology Consult Completed     1/12/2020 2142 Pulmonology (on-call MD unless specified) Completed           Pertinent Test Results:   Labs:  Results from last 7 days   Lab Units 01/19/20  0100  01/17/20 0527 01/16/20 0449 01/14/20 0527 01/12/20 1942   GLUCOSE mg/dL 199* 92 112*   < > 98  --  128*   SODIUM mmol/L 140 136 141   < > 139  --  140   POTASSIUM mmol/L 3.6 3.7 3.7   < > 3.4*  --  3.9   MAGNESIUM mg/dL  --   --   --   --  2.3  --  2.4   CO2 mmol/L 24.0 22.0 24.8   < > 22.4  --  24.5   CHLORIDE mmol/L 103 101 104   < > 104  --  99   ANION GAP mmol/L 13.0 13.0 12.2   < > 12.6  --  16.5*   CREATININE mg/dL 1.34* 1.12 1.28*   < > 1.65*   < > 2.98*   BUN mg/dL 10 10 9   < > 25*  --  38*   BUN / CREAT RATIO  7.5 8.9 7.0   < > 15.2  --  12.8   CALCIUM mg/dL 8.7 8.8 8.8   < > 8.0*  --  9.5   EGFR IF NONAFRICN AM mL/min/1.73  --   --   --   --   --   --  21*   ALK PHOS U/L  --   --   --   --   --   --  78   TOTAL PROTEIN g/dL  --   --   --   --   --   --  8.6*   ALT (SGPT) U/L  --   --   --   --   --   --  30   AST (SGOT) U/L  --   --   --   --   --   --  44*   BILIRUBIN mg/dL  --   --   --   --   --   --  0.5   ALBUMIN g/dL 2.60*  --   --   --   --   --  3.60   GLOBULIN gm/dL  --   --   --   --   --   --  5.0    < > = values in this interval not displayed.     Estimated Creatinine Clearance: 68.5 mL/min (A) (by C-G formula based on SCr of 1.34 mg/dL (H)).      Results from last 7 days   Lab Units 01/19/20  0100 01/16/20 0449 01/14/20 0527   WBC 10*3/mm3 5.44 6.30 7.01   RBC 10*6/mm3 2.76* 2.66* 2.64*   HEMOGLOBIN g/dL 8.2* 8.1* 8.0*   HEMATOCRIT % 25.7* 24.4* 23.9*   MCV fL 93.1 91.7 90.5   MCH pg 29.7 30.5 30.3   MCHC g/dL 31.9 33.2 33.5   RDW % 13.5 12.9 13.1   RDW-SD fl 45.5 42.8 43.2   MPV fL 9.4 9.8 10.0   PLATELETS 10*3/mm3 264 149 126*   NEUTROPHIL % % 36.5* 48.7 53.8   LYMPHOCYTE % % 43.4 30.5 28.2   MONOCYTES % % 13.1* 16.2* 13.1*   EOSINOPHIL % % 4.6 3.8 4.0   BASOPHIL % % 0.9 0.5 0.3   IMM GRAN % % 1.5* 0.3 0.6*   NEUTROS ABS 10*3/mm3 1.99 3.07 3.77   LYMPHS ABS 10*3/mm3 2.36 1.92 1.98   MONOS ABS 10*3/mm3 0.71 1.02* 0.92*   EOS ABS 10*3/mm3 0.25 0.24 0.28   BASOS ABS 10*3/mm3 0.05 0.03  0.02   IMMATURE GRANS (ABS) 10*3/mm3 0.08* 0.02 0.04   NRBC /100 WBC 0.2 0.0 0.1     Results from last 7 days   Lab Units 01/12/20 2004   PH, ARTERIAL pH units 7.341*   PO2 ART mm Hg 75.8*   PCO2, ARTERIAL mm Hg 47.8*   HCO3 ART mmol/L 25.9     Results from last 7 days   Lab Units 01/19/20  0100 01/17/20  1717 01/16/20  0658   TROPONIN T ng/mL <0.010 <0.010 <0.010     Results from last 7 days   Lab Units 01/14/20  0527 01/12/20  1942   PROBNP pg/mL 613.5 2,104.0*     Results from last 7 days   Lab Units 01/12/20  1942   TSH uIU/mL 0.413     Results from last 7 days   Lab Units 01/19/20  0100 01/16/20  0449 01/14/20  0527 01/13/20  0752 01/12/20 1942   LACTATE mmol/L 1.4  --   --  1.2 1.2   PROCALCITONIN ng/mL 1.42* 5.56* 21.68*  --   --            Imaging Results (Last 72 Hours)     Procedure Component Value Units Date/Time    XR Chest 1 View [[id number]] Collected:  01/17/20 1752     Updated:  01/17/20 1757    Narrative:       PORTABLE CHEST 01/17/2020 AT 5:13 PM     CLINICAL HISTORY: Chest pain     Compared to the previous chest x-ray dated 01/12/2020.     There is minimal patchy ill-defined increased density at both lung bases  that appears somewhat more prominent on the previous chest x-ray.  Worsening bibasilar pneumonia suspected. The upper lung zones remain  clear. There are no pleural effusions. The heart remains normal in size.     This report was finalized on 1/17/2020 5:54 PM by Dr. Narendra Rodriguez M.D.                    Condition on Discharge: Much improved    Vital Signs  Temp:  [96.2 °F (35.7 °C)-98.5 °F (36.9 °C)] 97.4 °F (36.3 °C)  Heart Rate:  [68-92] 70  Resp:  [18] 18  BP: (109-113)/(69-78) 112/69    Physical Exam:      General Appearance: Well-developed black male he is resting in bed in no apparent distress  Eyes: Conjunctiva are clear and anicteric  ENT: Mucas membranes are moist no erythema no exudates  Neck: Trachea midline no lymphadenopathy or thyromegaly no jugular venous  distention  Lungs: Clear nonlabored symmetric expansion no wheezes rales or rhonchi  Cardiac: Regular rate rhythm no murmur  Abdomen: Soft no palpable hepatosplenomegaly or masses, when I first pushed on his abdomen in the left upper quadrant he said it was a little sore but distract him and I was pushing a lot more a lot harder and he did not have any reaction  : No flank or costovertebral angle tenderness  Musculoskeletal: He did not have a whole lot of muscle mass in the lower extremities  Skin: No jaundice no petechiae skin is warm and dry  Neuro: He is alert oriented he is cooperative   Extremities/P Vascular: No edema palpable radial and dorsalis pedis pulses bilaterally  MSE: Is a little unusual personality and that not really changed  Discharge Disposition  Skilled Nursing Facility (DC - External)    Discharge Medications     Discharge Medications      New Medications      Instructions Start Date   acetaminophen 325 MG tablet  Commonly known as:  TYLENOL   650 mg, Oral, Every 4 Hours PRN      benzonatate 200 MG capsule  Commonly known as:  TESSALON   200 mg, Oral, Every 8 Hours PRN      cefepime 2 G/ ML solution  Commonly known as:  MAXIPIME   2 g, Intravenous, Every 8 Hours      guaifenesin-dextromethorphan  MG tablet sustained-release 12 hour tablet   1 tablet, Oral, 2 Times Daily      QUEtiapine 100 MG tablet  Commonly known as:  SEROQUEL  Replaces:  SEROQUEL  MG 24 hr tablet   100 mg, Oral, Nightly         Changes to Medications      Instructions Start Date   ipratropium-albuterol 0.5-2.5 mg/3 ml nebulizer  Commonly known as:  DUO-NEB  What changed:    · how to take this  · when to take this  · reasons to take this   3 mL, Nebulization, Every 4 Hours PRN         Continue These Medications      Instructions Start Date   atorvastatin 80 MG tablet  Commonly known as:  LIPITOR   80 mg, Oral, Daily      budesonide-formoterol 160-4.5 MCG/ACT inhaler  Commonly known as:  SYMBICORT   2 puffs,  Inhalation, 2 Times Daily      calcium carbonate-cholecalciferol 500-400 MG-UNIT tablet tablet   Oral      DULoxetine 60 MG capsule  Commonly known as:  CYMBALTA   60 mg, Oral, 2 Times Daily      ferrous sulfate 325 (65 FE) MG tablet   325 mg, Oral, Daily      gabapentin 600 MG tablet  Commonly known as:  NEURONTIN   600 mg, Oral, 2 Times Daily      guaiFENesin 100 MG/5ML syrup  Commonly known as:  ROBITUSSIN   200 mg, Oral, 4 Times Daily PRN      HYDROcodone-acetaminophen  MG per tablet  Commonly known as:  NORCO   1 tablet, Oral, Every 6 Hours PRN      latanoprost 0.005 % ophthalmic solution  Commonly known as:  XALATAN   No dose, route, or frequency recorded.      levETIRAcetam 750 MG tablet  Commonly known as:  KEPPRA   750 mg, Oral, 2 Times Daily      melatonin 5 MG tablet tablet   3 mg, Oral, Daily      nitroglycerin 0.4 MG SL tablet  Commonly known as:  NITROSTAT   0.4 mg, Sublingual      tamsulosin 0.4 MG capsule 24 hr capsule  Commonly known as:  FLOMAX   0.4 mg, Oral, Daily      traZODone 150 MG tablet  Commonly known as:  DESYREL   100 mg, Oral, Daily      vitamin D3 125 MCG (5000 UT) tablet tablet   1 tablet, Oral, Daily         Stop These Medications    bisacodyl 10 MG suppository  Commonly known as:  DULCOLAX     magnesium hydroxide 400 MG/5ML suspension  Commonly known as:  MILK OF MAGNESIA     metoprolol tartrate 25 MG tablet  Commonly known as:  LOPRESSOR     ondansetron 4 MG tablet  Commonly known as:  ZOFRAN     polyethylene glycol packet  Commonly known as:  MIRALAX     senna 8.6 MG tablet tablet  Commonly known as:  SENOKOT     SEROQUEL  MG 24 hr tablet  Generic drug:  QUEtiapine XR  Replaced by:  QUEtiapine 100 MG tablet     simethicone 125 MG chewable tablet  Commonly known as:  MYLICON     sodium chloride 0.65 % nasal spray     THERA-M MULTIPLE VITAMINS PO     tiZANidine 4 MG tablet  Commonly known as:  ZANAFLEX     vitamin C 500 MG tablet  Commonly known as:  ASCORBIC ACID             Discharge Diet: Regular    Activity at Discharge: Needs to be up working with physical therapy    Follow-up Appointments  No future appointments.      Test Results Pending at Discharge                Dustin Roberts MD  01/19/20  11:40 AM    Time: Spent over 32 minutes on patient's discharge today

## 2020-01-19 NOTE — PROGRESS NOTES
Continued Stay Note  [facility]     Patient Name: Yevgeniy Vang  MRN: [id number]  Today's Date: 1/19/2020    Admit Date: 1/12/2020    Discharge Plan     Row Name 01/19/20 0849       Plan    Plan  Await PICC Placement for DC to [facility].............Coral VEGA     Plan Comments  Call to SILVIA Diaz this AM, states patient had fluctuations in BP and SpO2 through the night and new orders noted. PICC line has not yet been placed, plans for placement today. Emily will notify CCP when/if patient ready for DC..........Coral VEGA     Row Name 01/19/20 0832       Plan    Plan  Awaiting PICC line placement for DC back to [facility]..........Coral VEGA     Patient/Family in Agreement with Plan  yes    Plan Comments  Late Entry 1/18/20: Spoke with SILVIA Diaz several times throughout the day in regards to transportation back to [facility]. Pt. cannot sit upright in car or WC, must have stretcher transport. [facility] did not return calls, [facility] no availability, [facility] no availability. [facility] stated their call volume exceeds their abilities at this time and would not schedule for the next 2 days. S/[facility] who can take on first shift if patient ready. SILVIA Diaz stated she would notify CCP once PICC placed as that is what we were waiting for to DC. Emily reached out to IV RN X2 and was awaiting call back when [place] VEGA took over her patients at 3pm. Call to [place] by this CCP at 4:42pm and she stated IV RN had come to unit but needed clarification of whether patient was to have midline or PICC. [place] called [facility] and they informed her patient needs PICC line, she stated she was contacting IV RN to update her and have PICC placed. Informed [place] we will need to know as soon as possible if patient is ready to DC to coordinate with ambulance crew and stated she would call when patient ready, aware CCP here until 7pm..............Coral VEGA         Discharge Codes    No  documentation.       Expected Discharge Date and Time     Expected Discharge Date Expected Discharge Time    Jan 18, 2020             Lalitha Murray RN

## 2020-01-19 NOTE — SIGNIFICANT NOTE
01/19/20 1217   PICC Single Lumen 01/19/20 Right Basilic   Placement Date/Time: 01/19/20 1217   Hand Hygiene Completed: Yes  Size (Fr): 4  Description (optional): [id number] exp 12-  Length (cm): 47 cm  Orientation: Right  Location: Basilic  Site Prep: Chlorhexidine isopropyl alcohol  All 5 Sterile Barri...   #1 Lumen Status Blood return noted;Capped;Flushed;Normal saline locked   Length romeo (cm) 47 cm   Extremity Circumference (cm) 34 cm   Dressing Type Border Dressing;Transparent;Securing device;Antimicrobial dressing/disc   Liquid Adhesive Applied   Dressing Change Due 01/26/20   Indication/Daily Review of Necessity intravenous medication therapy   Safety   Safety WDL WDL   All Alarms alarm(s) activated and audible   Safety/Security Measures bed alarm set   Safety Management   Safety Promotion/Fall Prevention fall prevention program maintained;nonskid shoes/slippers when out of bed;safety round/check completed   Elopement Precautions bed alarm;room near unit station   Environmental Safety Modification assistive device/personal items within reach;clutter free environment maintained;room near unit station;room organization consistent   Positioning   Body Position sitting up in bed

## 2020-01-20 NOTE — PROGRESS NOTES
Case Management Discharge Note      Final Note: Pt discharged to [facility] on 1/19.   MAYITO Mccoy RN         Destination - Selection Complete      Service Provider Request Status Selected Services Address Phone Number Fax Number    [facility] Selected Skilled Nursing [address] [address] [phone]      Durable Medical Equipment      No service has been selected for the patient.      Dialysis/Infusion      No service has been selected for the patient.      Home Medical Care      No service has been selected for the patient.      Therapy      No service has been selected for the patient.      Community Resources      No service has been selected for the patient.        Transportation Services  Ambulance: ([facility])    Final Discharge Disposition Code: 03 - skilled nursing facility (SNF)

## 2020-06-26 ENCOUNTER — LAB REQUISITION (OUTPATIENT)
Dept: LAB | Facility: HOSPITAL | Age: 70
End: 2020-06-26

## 2020-06-26 DIAGNOSIS — Z00.00 ROUTINE GENERAL MEDICAL EXAMINATION AT A HEALTH CARE FACILITY: ICD-10-CM

## 2020-06-26 LAB — NT-PROBNP SERPL-MCNC: 44.5 PG/ML (ref 5–900)

## 2020-06-26 PROCEDURE — 83880 ASSAY OF NATRIURETIC PEPTIDE: CPT
